# Patient Record
Sex: FEMALE | Race: WHITE | NOT HISPANIC OR LATINO | Employment: UNEMPLOYED | URBAN - NONMETROPOLITAN AREA
[De-identification: names, ages, dates, MRNs, and addresses within clinical notes are randomized per-mention and may not be internally consistent; named-entity substitution may affect disease eponyms.]

---

## 2017-07-12 ENCOUNTER — TRANSCRIBE ORDERS (OUTPATIENT)
Dept: CARDIOLOGY | Facility: HOSPITAL | Age: 37
End: 2017-07-12

## 2017-07-12 DIAGNOSIS — R55 SYNCOPE, UNSPECIFIED SYNCOPE TYPE: Primary | ICD-10-CM

## 2017-07-19 ENCOUNTER — HOSPITAL ENCOUNTER (OUTPATIENT)
Dept: CARDIOLOGY | Facility: HOSPITAL | Age: 37
Discharge: HOME | End: 2017-07-19
Admitting: NURSE PRACTITIONER

## 2017-07-19 DIAGNOSIS — R55 SYNCOPE, UNSPECIFIED SYNCOPE TYPE: ICD-10-CM

## 2017-07-19 PROCEDURE — 93306 TTE W/DOPPLER COMPLETE: CPT

## 2017-07-20 LAB — BH CV ECHO MEAS - EF(MOD-SP4): 59 %

## 2017-07-21 ENCOUNTER — OFFICE VISIT (OUTPATIENT)
Dept: NEUROLOGY | Facility: CLINIC | Age: 37
End: 2017-07-21

## 2017-07-21 VITALS
DIASTOLIC BLOOD PRESSURE: 64 MMHG | OXYGEN SATURATION: 99 % | BODY MASS INDEX: 21.37 KG/M2 | HEART RATE: 81 BPM | HEIGHT: 68 IN | SYSTOLIC BLOOD PRESSURE: 120 MMHG | WEIGHT: 141 LBS

## 2017-07-21 DIAGNOSIS — R55 SYNCOPE AND COLLAPSE: Primary | ICD-10-CM

## 2017-07-21 PROCEDURE — 99243 OFF/OP CNSLTJ NEW/EST LOW 30: CPT | Performed by: PSYCHIATRY & NEUROLOGY

## 2017-07-21 RX ORDER — SUMATRIPTAN 50 MG/1
TABLET, FILM COATED ORAL
COMMUNITY
Start: 2017-07-10 | End: 2023-03-08

## 2017-07-21 RX ORDER — PAROXETINE 30 MG/1
TABLET, FILM COATED ORAL
COMMUNITY
Start: 2017-07-10 | End: 2023-03-08

## 2017-07-21 RX ORDER — HYDROXYZINE PAMOATE 25 MG/1
CAPSULE ORAL
COMMUNITY
Start: 2017-06-08 | End: 2017-08-08 | Stop reason: ALTCHOICE

## 2017-07-21 RX ORDER — AMITRIPTYLINE HYDROCHLORIDE 50 MG/1
TABLET, FILM COATED ORAL
COMMUNITY
Start: 2017-06-08 | End: 2023-03-08

## 2017-07-21 NOTE — PROGRESS NOTES
[facility] NEUROLOGY [place] CONSULTATION   History of Present Illness     Date: 7/22/2017    Patient Identification  Merissa Wiggins is a 37 y.o. female.    Patient information was obtained from patient and spouse/SO.  History/Exam limitations: none.    CONSULTATION requested by: Layla Pennington Ma*      Chief Complaint   Seizures (NP/Pt has had sz like activity a few weeks ago, pt has had this happen in the past and went to the ER)      History of Present Illness   Patient is a delightful 37-year-old lady referred to [facility] neurology [place] for evaluation of seizure like activity.  According the  there is no warning aura and there is no postictal confusion after the episode.  She was brought to the outside hospital for EEG and MRI scan and none of them available this visit she has never been eval by cardiologist  .   PMH:   Past Medical History:   Diagnosis Date   • Anxiety    • Depression    • Migraine        Past Surgical History:   Past Surgical History:   Procedure Laterality Date   • CHOLECYSTECTOMY  2016   • POSTPARTUM TUBAL LIGATION Bilateral 2007       Family Hisotry:   Family History   Problem Relation Age of Onset   • Hypertension Mother    • Mental illness Mother    • Mental illness Sister    • Hypertension Maternal Aunt    • Mental illness Maternal Aunt    • Mental illness Paternal Aunt    • Hypertension Maternal Grandmother    • Breast cancer Maternal Grandmother    • Alzheimer's disease Maternal Grandfather    • Diabetes Maternal Grandfather    • Hypertension Maternal Grandfather    • Mental illness Sister    • Mental illness Maternal Aunt    • Mental illness Paternal Aunt        Social History:   Social History     Social History   • Marital status:      Spouse name: N/A   • Number of children: N/A   • Years of education: N/A     Occupational History   • Not on file.     Social History Main Topics   • Smoking status: Current Every Day Smoker     Packs/day: 1.00      "Years: 30.00   • Smokeless tobacco: Never Used   • Alcohol use No   • Drug use: No   • Sexual activity: Defer     Other Topics Concern   • Not on file     Social History Narrative   • No narrative on file       Medications:   Current Outpatient Prescriptions   Medication Sig Dispense Refill   • hydrOXYzine (VISTARIL) 25 MG capsule Take one at bedtime     • PARoxetine (PAXIL) 30 MG tablet Take one daily     • SUMAtriptan (IMITREX) 50 MG tablet Take one at the onset of migraine, may repeat in 2 hours if needed. Do not exceed 2 in 24 hours     • amitriptyline (ELAVIL) 50 MG tablet Take 2 tablets at bedtime       No current facility-administered medications for this visit.        Allergy:   Allergies   Allergen Reactions   • Penicillins Other (See Comments)     Pt states \"I passed out\"       Review of Systems:  Review of Systems    Physical Exam     Vitals:    07/21/17 1453   BP: 120/64   Pulse: 81   SpO2: 99%   Weight: 141 lb (64 kg)   Height: 68\" (172.7 cm)     GENERAL: Patient is pleasant, cooperative, appears to be stated age.  Body habitus is endomorphic.  SKIN AND EXTREMITIES:  No skin rashes or lesions are noted.  No cyanosis, clubbing or edema of the extremities.    HEAD:  Head is normocephalic and atraumatic.    NECK: Neck are non-tender without thyromegaly or adenopathy.  Carotic upstrokes are 1+/4.  No cranial or cervical bruits.  The neck is supple with a full range of motion.   ENT: palate elevate symmetrically, no evidence of high arch palate, tongue midline erythema in posterior pharynx, Mallampati Classification Class III   CARDIOVASCULAR:  Regular rate and rhythm with normal S1 and S2 without rub or gallop.  RESPIRATORY:  Clear to auscultation without wheezes or crackle   ABDOMEN:  Soft and non-tender, positive bowel sound without hepatosplenomegaly  BACK:  Back is straight without midline defect.    PSYCH:  Higher cortical function/mental status:  The patient is alert.  She is oriented x3 to time, " place and person.  Recent and the remote memory appear normal.  The patient has a good fund of knowledge.  There is no visual or auditory hallucination or suicidal or homicidal ideation.  SPEECH:There is no gross evidence of aphasia, dysarthria or agnosia.      CRANIAL NERVES:  Pupils are 4mm, equal round reactive to light, reacting briskly to 2mm without afferent pupillary defect.  Visual fields are intact to confrontation testing.  Fundoscopic examination reveals sharp disk margins with normal vasculature.  No papilledema, hemorrhages or exudates.  Extraocular movements are full and smooth with normal pursuits and saccades.  No nystagmus noted.  The face is symmetric. palate elevate symmetrically, Tongue midline, positive gag reflex. The remainder of the cranial nerves are intact and symmetrical.    MOTOR: Strength is 5/5 throughout with normal tone and bulk with the following exceptions, 4/5 intrinsic muscles of the hands and feet.  No involuntary movements noted.    Deep Tendon Reflexes: are 2/4 and symmetrical in the upper extremities, 2/4 and symmetrical at the knees and 1/4 and symmetrical at the Achilles tendon.  Plantar responses were down-going bilaterally.    SENSATION:  Intact to pinprick, light touch, vibration and proprioception.  Coordination:  The patient normally performs finger-nose-finger, heel-to-knee-to-shin and rapid alternating movements in symmetrical fashion.    COORDINATION AND GAIT:  The patient walks with a narrow-based gait.  Patient is able to heel-toe and tandem walk forward and backwards without difficulty.  Romberg and monopedal  Romberg are negative.    MUSCULOSKELETAL: Range of motion normal, no clubbing, cyanosis, or edema.  No joint swelling.            Records Reviewed: I have personally reviewed her previous medical record.    Merissa was seen today for seizures.    Diagnoses and all orders for this visit:    Syncope and collapse  -     Ambulatory Referral to  Cardiology      Treatments:  1.  I'll refer this patient to cardiologist for further evaluation for syncope  2.  We'll obtain medical records including MRI and EEG from outside hospital for further evaluation  3.  Advised patient not to drive for 3 month from her last episode of syncope    This Document is signed by Guicho Fuller MD, FAAN, FAASM July 22, 20175:15 PM

## 2017-07-21 NOTE — PROGRESS NOTES
[facility] NEUROLOGY [place] CONSULTATION   History of Present Illness     Date: 7/22/2017    Patient Identification  Merissa Wiggins is a 37 y.o. female.    Patient information was obtained from patient.  History/Exam limitations: none.    CONSULTATION requested by: Layla Pennington Ma*      Chief Complaint   Seizures (NP/Pt has had sz like activity a few weeks ago, pt has had this happen in the past and went to the ER)      History of Present Illness   Patient is a pleasant 37-year-old lady who been right notes of seizure disorder 2 and half months ago and the patient denies any triggering factors or any symptoms with the would bring on her symptoms patient reported that her seizure is of brief loss of consciousness without any warning and there is no postictal state.    PMH:   Past Medical History:   Diagnosis Date   • Anxiety    • Depression    • Migraine        Past Surgical History:   Past Surgical History:   Procedure Laterality Date   • CHOLECYSTECTOMY  2016   • POSTPARTUM TUBAL LIGATION Bilateral 2007       Family Hisotry:   Family History   Problem Relation Age of Onset   • Hypertension Mother    • Mental illness Mother    • Mental illness Sister    • Hypertension Maternal Aunt    • Mental illness Maternal Aunt    • Mental illness Paternal Aunt    • Hypertension Maternal Grandmother    • Breast cancer Maternal Grandmother    • Alzheimer's disease Maternal Grandfather    • Diabetes Maternal Grandfather    • Hypertension Maternal Grandfather    • Mental illness Sister    • Mental illness Maternal Aunt    • Mental illness Paternal Aunt        Social History:   Social History     Social History   • Marital status:      Spouse name: N/A   • Number of children: N/A   • Years of education: N/A     Occupational History   • Not on file.     Social History Main Topics   • Smoking status: Current Every Day Smoker     Packs/day: 1.00     Years: 30.00   • Smokeless tobacco: Never Used   • Alcohol use No  "  • Drug use: No   • Sexual activity: Defer     Other Topics Concern   • Not on file     Social History Narrative   • No narrative on file       Medications:   Current Outpatient Prescriptions   Medication Sig Dispense Refill   • hydrOXYzine (VISTARIL) 25 MG capsule Take one at bedtime     • PARoxetine (PAXIL) 30 MG tablet Take one daily     • SUMAtriptan (IMITREX) 50 MG tablet Take one at the onset of migraine, may repeat in 2 hours if needed. Do not exceed 2 in 24 hours     • amitriptyline (ELAVIL) 50 MG tablet Take 2 tablets at bedtime       No current facility-administered medications for this visit.        Allergy:   Allergies   Allergen Reactions   • Penicillins Other (See Comments)     Pt states \"I passed out\"       Review of Systems:  Review of Systems   Constitutional: Negative for chills and fever.   HENT: Negative for congestion, ear pain, hearing loss, rhinorrhea and sore throat.    Eyes: Negative for pain, discharge and redness.   Respiratory: Negative for cough, shortness of breath, wheezing and stridor.    Cardiovascular: Negative for chest pain, palpitations and leg swelling.   Gastrointestinal: Negative for abdominal pain, constipation, nausea and vomiting.   Endocrine: Negative for cold intolerance, heat intolerance and polyphagia.   Genitourinary: Negative for dysuria, flank pain, frequency and urgency.   Musculoskeletal: Negative for joint swelling, myalgias, neck pain and neck stiffness.   Skin: Negative for pallor, rash and wound.   Allergic/Immunologic: Negative for environmental allergies.   Neurological: Positive for syncope. Negative for dizziness, tremors, seizures, facial asymmetry, speech difficulty, weakness, light-headedness, numbness and headaches.   Hematological: Negative for adenopathy.   Psychiatric/Behavioral: Negative for confusion and hallucinations. The patient is not nervous/anxious.        Physical Exam     Vitals:    07/21/17 1453   BP: 120/64   Pulse: 81   SpO2: 99% " "  Weight: 141 lb (64 kg)   Height: 68\" (172.7 cm)     GENERAL: Patient is pleasant, cooperative, appears to be stated age.  Body habitus is endomorphic.  SKIN AND EXTREMITIES:  No skin rashes or lesions are noted.  No cyanosis, clubbing or edema of the extremities.    HEAD:  Head is normocephalic and atraumatic.    NECK: Neck are non-tender without thyromegaly or adenopathy.  Carotic upstrokes are 1+/4.  No cranial or cervical bruits.  The neck is supple with a full range of motion.   ENT: palate elevate symmetrically, no evidence of high arch palate, tongue midline erythema in posterior pharynx, Mallampati Classification Class III   CARDIOVASCULAR:  Regular rate and rhythm with normal S1 and S2 without rub or gallop.  RESPIRATORY:  Clear to auscultation without wheezes or crackle   ABDOMEN:  Soft and non-tender, positive bowel sound without hepatosplenomegaly  BACK:  Back is straight without midline defect.    PSYCH:  Higher cortical function/mental status:  The patient is alert.  She is oriented x3 to time, place and person.  Recent and the remote memory appear normal.  The patient has a good fund of knowledge.  There is no visual or auditory hallucination or suicidal or homicidal ideation.  SPEECH:There is no gross evidence of aphasia, dysarthria or agnosia.      CRANIAL NERVES:  Pupils are 4mm, equal round reactive to light, reacting briskly to 2mm without afferent pupillary defect.  Visual fields are intact to confrontation testing.  Fundoscopic examination reveals sharp disk margins with normal vasculature.  No papilledema, hemorrhages or exudates.  Extraocular movements are full and smooth with normal pursuits and saccades.  No nystagmus noted.  The face is symmetric. palate elevate symmetrically, Tongue midline, positive gag reflex. The remainder of the cranial nerves are intact and symmetrical.    MOTOR: Strength is 5/5 throughout with normal tone and bulk with the following exceptions, 4/5 intrinsic " muscles of the hands and feet.  No involuntary movements noted.    Deep Tendon Reflexes: are 2/4 and symmetrical in the upper extremities, 2/4 and symmetrical at the knees and 1/4 and symmetrical at the Achilles tendon.  Plantar responses were down-going bilaterally.    SENSATION:  Intact to pinprick, light touch, vibration and proprioception.  Coordination:  The patient normally performs finger-nose-finger, heel-to-knee-to-shin and rapid alternating movements in symmetrical fashion.    COORDINATION AND GAIT:  The patient walks with a narrow-based gait.  Patient is able to heel-toe and tandem walk forward and backwards without difficulty.  Romberg and monopedal  Romberg are negative.    MUSCULOSKELETAL: Range of motion normal, no clubbing, cyanosis, or edema.  No joint swelling.              Records Reviewed: I have personally reviewed her previous medical record.    Merissa was seen today for seizures.    Diagnoses and all orders for this visit:    Syncope and collapse  -     Ambulatory Referral to Cardiology      Treatments:  1.  We'll refer this patient to cardiologist for further evaluation for syncope  2.  We'll obtain medical record from outside hospital to further evaluate her EEG and MRI of the brain  3.  Her  who witnessed the episode did not mention any postictal state and there is brief loss of consciousness without any warning aura    This Document is signed by Guicho Fuller MD, FAAN, FAASM July 22, 20175:12 PM

## 2017-08-08 ENCOUNTER — CONSULT (OUTPATIENT)
Dept: CARDIOLOGY | Facility: CLINIC | Age: 37
End: 2017-08-08

## 2017-08-08 VITALS
OXYGEN SATURATION: 100 % | SYSTOLIC BLOOD PRESSURE: 128 MMHG | WEIGHT: 138 LBS | HEART RATE: 90 BPM | DIASTOLIC BLOOD PRESSURE: 84 MMHG | BODY MASS INDEX: 20.92 KG/M2 | HEIGHT: 68 IN | RESPIRATION RATE: 18 BRPM

## 2017-08-08 DIAGNOSIS — R00.2 PALPITATIONS: ICD-10-CM

## 2017-08-08 DIAGNOSIS — R06.02 SHORTNESS OF BREATH: ICD-10-CM

## 2017-08-08 DIAGNOSIS — R42 DIZZINESS: ICD-10-CM

## 2017-08-08 DIAGNOSIS — R07.9 CHEST PAIN IN ADULT: Primary | ICD-10-CM

## 2017-08-08 DIAGNOSIS — R55 VASOVAGAL SYNCOPE: ICD-10-CM

## 2017-08-08 DIAGNOSIS — R94.31 ABNORMAL ECG: ICD-10-CM

## 2017-08-08 PROCEDURE — 99204 OFFICE O/P NEW MOD 45 MIN: CPT | Performed by: INTERNAL MEDICINE

## 2017-08-08 PROCEDURE — 93010 ELECTROCARDIOGRAM REPORT: CPT | Performed by: INTERNAL MEDICINE

## 2017-08-08 NOTE — PROGRESS NOTES
"    Subjective:     Encounter Date:08/08/2017      Patient ID: Merissa Wiggins is a 37 y.o. female.    Chief Complaint:Syncope, palpitations, dizziness, shortness of breath, chest pain and edema  HPI  This is a 37-year-old female patient who reports having multiple syncopal episodes.  The patient indicates that she has had syncopal episodes.  All my life\".  Over the last 3 months she has had 4 episodes.  She indicates that prior to the syncopal episode she will become diaphoretic and will have a heart flushing sensation.  There is no nausea but she does have some chest tightness and shortness of breath prior to the event.  She will then developed severe dizziness with a sense of tunnel vision just before she passes out.  She has complete loss of consciousness which lasts for less than 1 minute.  Several of these episodes have been witnessed by her  and there is no tonic-clonic seizure activity.  She has had no loss of bowel or bladder function.  She does not bite her tongue or the inside of her mouth.  After the episode she feels extremely weak and fatigued and will often sleep for 2-3 days post event.  In addition after the episode she is very disoriented and confused which takes several hours for her mental status to clear up.  Separate from her syncope the patient reports having chest discomfort which has been occurring for approximately 2 years.  Her chest discomfort is localized to a discrete area in her central sternum and does not radiate.  It has a sharp stabbing quality.  It typically has a 6/10 in intensity which is variable.  She cannot identify any precipitating aggravating or alleviating features.  It tends to occur approximately 4-5 times per week and generally last anywhere from a few minutes to several hours.  The discomfort usually occurs while she is working.  Otherwise she cannot identify other aggravating features.  She has never had any form of testing other than a 12-lead " electrocardiogram.  She has no history of hypertension diabetes or hypercholesterolemia.  She is an active smoker smoking at least one pack of cigarettes per day.  Her family history is positive for premature coronary disease.  The patient indicates that she is unable to do treadmill exercise stress testing due to the severity of her shortness of breath and poor effort tolerance.  The patient reports having shortness of breath both at rest and with activity.  The frequency duration and intensity of her chest discomfort and shortness of breath have all gradually worsened over the last several months.  The patient has no orthopnea or PND but does have lower extremity edema which is typically worse if she's been on her feet for prolonged periods of time.  The patient also reports having palpitations with a sense that her heart is pounding and racing.  This is been occurring for the last several months.  It typically occurs 3-4 times per week and generally last anywhere from a few seconds to several minutes.  There is some associated dizziness.  This has occasionally accompanied her syncopal episodes but not consistently.  The following portions of the patient's history were reviewed and updated as appropriate: allergies, current medications, past family history, past medical history, past social history, past surgical history and problem  Review of Systems   Constitution: Positive for weakness and malaise/fatigue. Negative for chills, diaphoresis, fever, night sweats, weight gain and weight loss.   HENT: Negative for ear discharge, hearing loss, hoarse voice and nosebleeds.    Eyes: Negative for discharge, double vision, pain and photophobia.   Cardiovascular: Positive for chest pain, dyspnea on exertion, irregular heartbeat, leg swelling, near-syncope, palpitations and syncope. Negative for claudication, cyanosis, orthopnea and paroxysmal nocturnal dyspnea.   Respiratory: Positive for shortness of breath. Negative for  cough, hemoptysis, sputum production and wheezing.    Endocrine: Negative for cold intolerance, heat intolerance, polydipsia, polyphagia and polyuria.   Hematologic/Lymphatic: Negative for adenopathy and bleeding problem. Does not bruise/bleed easily.   Skin: Negative for color change, flushing, itching and rash.   Musculoskeletal: Negative for muscle cramps, muscle weakness, myalgias and stiffness.   Gastrointestinal: Negative for abdominal pain, diarrhea, hematemesis, hematochezia, nausea and vomiting.   Genitourinary: Negative for dysuria, frequency and nocturia.   Neurological: Positive for dizziness. Negative for focal weakness, loss of balance, numbness, paresthesias and seizures.   Psychiatric/Behavioral: Negative for altered mental status, hallucinations and suicidal ideas.   Allergic/Immunologic: Negative for HIV exposure, hives and persistent infections.       Current Outpatient Prescriptions:   •  amitriptyline (ELAVIL) 50 MG tablet, Take 2 tablets at bedtime, Disp: , Rfl:   •  PARoxetine (PAXIL) 30 MG tablet, Take one daily, Disp: , Rfl:   •  SUMAtriptan (IMITREX) 50 MG tablet, Take one at the onset of migraine, may repeat in 2 hours if needed. Do not exceed 2 in 24 hours, Disp: , Rfl:      Objective:     Physical Exam   Constitutional: She is oriented to person, place, and time. She appears well-developed and well-nourished.   HENT:   Head: Normocephalic and atraumatic.   Mouth/Throat: Oropharynx is clear and moist.   Eyes: Conjunctivae and EOM are normal. Pupils are equal, round, and reactive to light. No scleral icterus.   Neck: Normal range of motion. Neck supple. No JVD present. No tracheal deviation present. No thyromegaly present.   Cardiovascular: Normal rate, regular rhythm, S1 normal, S2 normal, normal heart sounds, intact distal pulses and normal pulses.  PMI is not displaced.  Exam reveals no gallop and no friction rub.    No murmur heard.  Pulmonary/Chest: Effort normal and breath sounds  "normal. No respiratory distress. She has no wheezes. She has no rales.   Abdominal: Soft. Bowel sounds are normal. She exhibits no distension and no mass. There is no tenderness. There is no rebound and no guarding.   Musculoskeletal: Normal range of motion. She exhibits no edema or deformity.   Neurological: She is alert and oriented to person, place, and time. She displays normal reflexes. No cranial nerve deficit. Coordination normal.   Skin: Skin is warm and dry. No rash noted. No erythema.   Psychiatric: She has a normal mood and affect. Her behavior is normal. Thought content normal.     Blood pressure 128/84, pulse 90, resp. rate 18, height 68\" (172.7 cm), weight 138 lb (62.6 kg), SpO2 100 %.   Lab Review:       Assessment:         1. Chest pain in adult  The patient's chest discomfort is mostly atypical.  She does have several risk factors for coronary artery disease.  She has never had a stress test.  She indicates that she is unable to do treadmill stress testing due to her poor effort tolerance and severe shortness of breath.  - ECG 12 Lead  - Stress Test With Myocardial Perfusion - Two Day    2. Shortness of breath  I suspect this is multifactorial.  Some is certainly related to her ongoing tobacco abuse.  She probably has an element of early tobacco-related lung disease.  This could also represent unrecognized coronary artery disease as an angina equivalent.  The patient has had an echocardiogram performed which showed normal left ventricular systolic and diastolic function.  There was no evidence of significant valvular abnormalities, pericardial disease, intracardiac shunting or pulmonary hypertension.  - ECG 12 Lead  - Stress Test With Myocardial Perfusion - Two Day    3. Palpitations  The patient could be having an underlying arrhythmia.  - ECG 12 Lead  - Holter Monitor - 48 Hour; Future    4. Dizziness  I suspect her dizziness is related to her neurocardiogenic syncope.  His could also be related " to an underlying arrhythmia.  - ECG 12 Lead  - Holter Monitor - 48 Hour; Future    5. Vasovagal syncope  I suspect the patient may be experiencing reflex syncope.  Some of her symptoms particularly having the profound fatigue for several days after an episode as well as severe confusion and disorientation after the event sounds suspicious for a post ictal state related to generalized seizures.  Several of these episodes have been witnessed by her  who is not recognized any tonic-clonic seizure activity.  - Tilt Table  - Holter Monitor - 48 Hour; Future    6. Abnormal ECG  Her 12-lead electrocardiogram shows QS complexes present in leads V1 and V2 and V3 as well as evidence of left atrial enlargement.  She has no personal history of documented myocardial infarction.    ECG 12 Lead  Date/Time: 8/8/2017 10:29 AM  Performed by: ALEX FROST  Authorized by: ALEX FROST   Rhythm: sinus rhythm  Rate: normal  QRS axis: normal  Clinical impression: normal ECG  Comments: Decreased voltage in the right and mid precordial leads.  Nonspecific ST segment changes.             Plan:       I have recommended a head upright tilt table test, a vasodilator nuclear stress test and a 48 hour Holter monitor.  She has been counseled regarding the essential need to discontinue cigarette smoking.  No changes in her medication profile have been made at today's visit.  Further recommendations will be predicated on the results of her outpatient testing.

## 2017-08-10 ENCOUNTER — HOSPITAL ENCOUNTER (EMERGENCY)
Facility: HOSPITAL | Age: 37
Discharge: HOME | End: 2017-08-10
Attending: EMERGENCY MEDICINE | Admitting: EMERGENCY MEDICINE

## 2017-08-10 VITALS
HEIGHT: 68 IN | HEART RATE: 70 BPM | TEMPERATURE: 98 F | OXYGEN SATURATION: 100 % | BODY MASS INDEX: 21.22 KG/M2 | WEIGHT: 140 LBS | SYSTOLIC BLOOD PRESSURE: 130 MMHG | DIASTOLIC BLOOD PRESSURE: 87 MMHG | RESPIRATION RATE: 18 BRPM

## 2017-08-10 DIAGNOSIS — R11.2 NON-INTRACTABLE VOMITING WITH NAUSEA, UNSPECIFIED VOMITING TYPE: ICD-10-CM

## 2017-08-10 DIAGNOSIS — R51.9 NONINTRACTABLE HEADACHE, UNSPECIFIED CHRONICITY PATTERN, UNSPECIFIED HEADACHE TYPE: Primary | ICD-10-CM

## 2017-08-10 LAB
ALBUMIN SERPL-MCNC: 3.8 G/DL (ref 3.5–5)
ALBUMIN/GLOB SERPL: 1.2 G/DL (ref 1–2)
ALP SERPL-CCNC: 163 U/L (ref 38–126)
ALT SERPL W P-5'-P-CCNC: 96 U/L (ref 13–69)
ANION GAP SERPL CALCULATED.3IONS-SCNC: 12.7 MMOL/L
AST SERPL-CCNC: 52 U/L (ref 15–46)
BASOPHILS # BLD AUTO: 0.03 10*3/MM3 (ref 0–0.2)
BASOPHILS NFR BLD AUTO: 0.3 % (ref 0–2.5)
BILIRUB SERPL-MCNC: 0.4 MG/DL (ref 0.2–1.3)
BILIRUB UR QL STRIP: NEGATIVE
BUN BLD-MCNC: 10 MG/DL (ref 7–20)
BUN/CREAT SERPL: 14.3 (ref 7.1–23.5)
CALCIUM SPEC-SCNC: 8.9 MG/DL (ref 8.4–10.2)
CHLORIDE SERPL-SCNC: 107 MMOL/L (ref 98–107)
CLARITY UR: CLEAR
CO2 SERPL-SCNC: 24 MMOL/L (ref 26–30)
COLOR UR: YELLOW
CREAT BLD-MCNC: 0.7 MG/DL (ref 0.6–1.3)
DEPRECATED RDW RBC AUTO: 50.3 FL (ref 37–54)
EOSINOPHIL # BLD AUTO: 0.47 10*3/MM3 (ref 0–0.7)
EOSINOPHIL NFR BLD AUTO: 4.1 % (ref 0–7)
ERYTHROCYTE [DISTWIDTH] IN BLOOD BY AUTOMATED COUNT: 15.6 % (ref 11.5–14.5)
GFR SERPL CREATININE-BSD FRML MDRD: 93 ML/MIN/1.73
GLOBULIN UR ELPH-MCNC: 3.2 GM/DL
GLUCOSE BLD-MCNC: 103 MG/DL (ref 74–98)
GLUCOSE UR STRIP-MCNC: NEGATIVE MG/DL
HCG SERPL QL: NEGATIVE
HCT VFR BLD AUTO: 37.6 % (ref 37–47)
HGB BLD-MCNC: 12.3 G/DL (ref 12–16)
HGB UR QL STRIP.AUTO: NEGATIVE
IMM GRANULOCYTES # BLD: 0.06 10*3/MM3 (ref 0–0.06)
IMM GRANULOCYTES NFR BLD: 0.5 % (ref 0–0.6)
KETONES UR QL STRIP: ABNORMAL
LEUKOCYTE ESTERASE UR QL STRIP.AUTO: NEGATIVE
LIPASE SERPL-CCNC: 101 U/L (ref 23–300)
LYMPHOCYTES # BLD AUTO: 1.51 10*3/MM3 (ref 0.6–3.4)
LYMPHOCYTES NFR BLD AUTO: 13.3 % (ref 10–50)
MCH RBC QN AUTO: 28.9 PG (ref 27–31)
MCHC RBC AUTO-ENTMCNC: 32.7 G/DL (ref 30–37)
MCV RBC AUTO: 88.3 FL (ref 81–99)
MONOCYTES # BLD AUTO: 0.65 10*3/MM3 (ref 0–0.9)
MONOCYTES NFR BLD AUTO: 5.7 % (ref 0–12)
NEUTROPHILS # BLD AUTO: 8.65 10*3/MM3 (ref 2–6.9)
NEUTROPHILS NFR BLD AUTO: 76.1 % (ref 37–80)
NITRITE UR QL STRIP: NEGATIVE
NRBC BLD MANUAL-RTO: 0 /100 WBC (ref 0–0)
PH UR STRIP.AUTO: 5.5 [PH] (ref 5–8)
PLATELET # BLD AUTO: 383 10*3/MM3 (ref 130–400)
PMV BLD AUTO: 9.6 FL (ref 6–12)
POTASSIUM BLD-SCNC: 3.7 MMOL/L (ref 3.5–5.1)
PROT SERPL-MCNC: 7 G/DL (ref 6.3–8.2)
PROT UR QL STRIP: NEGATIVE
RBC # BLD AUTO: 4.26 10*6/MM3 (ref 4.2–5.4)
SODIUM BLD-SCNC: 140 MMOL/L (ref 137–145)
SP GR UR STRIP: 1.02 (ref 1–1.03)
UROBILINOGEN UR QL STRIP: ABNORMAL
WBC NRBC COR # BLD: 11.37 10*3/MM3 (ref 4.8–10.8)

## 2017-08-10 PROCEDURE — 85025 COMPLETE CBC W/AUTO DIFF WBC: CPT | Performed by: EMERGENCY MEDICINE

## 2017-08-10 PROCEDURE — 96361 HYDRATE IV INFUSION ADD-ON: CPT

## 2017-08-10 PROCEDURE — 84703 CHORIONIC GONADOTROPIN ASSAY: CPT | Performed by: EMERGENCY MEDICINE

## 2017-08-10 PROCEDURE — 83690 ASSAY OF LIPASE: CPT | Performed by: EMERGENCY MEDICINE

## 2017-08-10 PROCEDURE — 93005 ELECTROCARDIOGRAM TRACING: CPT | Performed by: EMERGENCY MEDICINE

## 2017-08-10 PROCEDURE — 25010000002 METOCLOPRAMIDE PER 10 MG: Performed by: EMERGENCY MEDICINE

## 2017-08-10 PROCEDURE — 99284 EMERGENCY DEPT VISIT MOD MDM: CPT

## 2017-08-10 PROCEDURE — 80053 COMPREHEN METABOLIC PANEL: CPT | Performed by: EMERGENCY MEDICINE

## 2017-08-10 PROCEDURE — 81003 URINALYSIS AUTO W/O SCOPE: CPT | Performed by: EMERGENCY MEDICINE

## 2017-08-10 PROCEDURE — 25010000002 ONDANSETRON PER 1 MG: Performed by: EMERGENCY MEDICINE

## 2017-08-10 PROCEDURE — 25010000002 DIPHENHYDRAMINE PER 50 MG: Performed by: EMERGENCY MEDICINE

## 2017-08-10 PROCEDURE — 96375 TX/PRO/DX INJ NEW DRUG ADDON: CPT

## 2017-08-10 PROCEDURE — 25010000002 KETOROLAC TROMETHAMINE PER 15 MG: Performed by: EMERGENCY MEDICINE

## 2017-08-10 PROCEDURE — 96374 THER/PROPH/DIAG INJ IV PUSH: CPT

## 2017-08-10 RX ORDER — METOCLOPRAMIDE HYDROCHLORIDE 5 MG/ML
10 INJECTION INTRAMUSCULAR; INTRAVENOUS ONCE
Status: COMPLETED | OUTPATIENT
Start: 2017-08-10 | End: 2017-08-10

## 2017-08-10 RX ORDER — KETOROLAC TROMETHAMINE 30 MG/ML
30 INJECTION, SOLUTION INTRAMUSCULAR; INTRAVENOUS ONCE
Status: COMPLETED | OUTPATIENT
Start: 2017-08-10 | End: 2017-08-10

## 2017-08-10 RX ORDER — ONDANSETRON 2 MG/ML
4 INJECTION INTRAMUSCULAR; INTRAVENOUS ONCE
Status: COMPLETED | OUTPATIENT
Start: 2017-08-10 | End: 2017-08-10

## 2017-08-10 RX ORDER — ONDANSETRON 4 MG/1
4 TABLET, FILM COATED ORAL EVERY 6 HOURS PRN
Qty: 10 TABLET | Refills: 0 | Status: SHIPPED | OUTPATIENT
Start: 2017-08-10 | End: 2017-10-24

## 2017-08-10 RX ORDER — DIPHENHYDRAMINE HYDROCHLORIDE 50 MG/ML
50 INJECTION INTRAMUSCULAR; INTRAVENOUS ONCE
Status: COMPLETED | OUTPATIENT
Start: 2017-08-10 | End: 2017-08-10

## 2017-08-10 RX ADMIN — METOCLOPRAMIDE 10 MG: 5 INJECTION, SOLUTION INTRAMUSCULAR; INTRAVENOUS at 07:54

## 2017-08-10 RX ADMIN — KETOROLAC TROMETHAMINE 30 MG: 30 INJECTION, SOLUTION INTRAMUSCULAR at 07:50

## 2017-08-10 RX ADMIN — ONDANSETRON 4 MG: 2 INJECTION INTRAMUSCULAR; INTRAVENOUS at 07:49

## 2017-08-10 RX ADMIN — SODIUM CHLORIDE 1000 ML: 9 INJECTION, SOLUTION INTRAVENOUS at 07:48

## 2017-08-10 RX ADMIN — DIPHENHYDRAMINE HYDROCHLORIDE 50 MG: 50 INJECTION, SOLUTION INTRAMUSCULAR; INTRAVENOUS at 07:52

## 2017-08-10 NOTE — ED PROVIDER NOTES
"Subjective   HPI Comments: 37 year old female presenting with nausea, vomiting, \"spells\". She is brought in by her  who helps provide the history. This morning patient had a spell where she \"blacked out\", was out for several minutes apparently. For the last 3-4 days she has had headache, nausea, vomiting, \"unable to keep down anything\". This morning the spell was accompanied by \"shaking all over\". She was in bed and did not fall. She denies any chest pain, shortness of breath, abdominal pain, fevers, chills.  Within the last 2 weeks she has been seen by both neurology and cardiology for these \"spells\".  She's had multiple tests run has multiple studies coming up in the next couple weeks.  Apparently they have been going on for even longer than that, upwards of nearly 6 months.  She is usually very tired for several days after a spell.  She's also been evaluated at several outside hospitals.  She's had head imaging that is reported as negative.      Review of Systems   Constitutional: Negative for chills and fever.   HENT: Negative for congestion, rhinorrhea and sore throat.    Eyes: Negative for pain.   Respiratory: Negative for cough and shortness of breath.    Cardiovascular: Negative for chest pain, palpitations and leg swelling.   Gastrointestinal: Positive for nausea and vomiting. Negative for abdominal pain and diarrhea.   Genitourinary: Negative for dysuria.   Musculoskeletal: Negative for arthralgias.   Skin: Negative for rash.   Neurological: Positive for syncope and headaches. Negative for weakness and numbness.   Psychiatric/Behavioral: Negative for behavioral problems.       Past Medical History:   Diagnosis Date   • Anxiety    • Depression    • GERD (gastroesophageal reflux disease)    • Migraine        Allergies   Allergen Reactions   • Penicillins Other (See Comments)     Pt states \"I passed out\"       Past Surgical History:   Procedure Laterality Date   • CHOLECYSTECTOMY  2016   • POSTPARTUM " TUBAL LIGATION Bilateral 2007   • TUBAL ABDOMINAL LIGATION         Family History   Problem Relation Age of Onset   • Hypertension Mother    • Mental illness Mother    • Hyperlipidemia Mother    • Migraines Mother    • Diabetes Mother    • Mental illness Sister    • Hypertension Maternal Aunt    • Mental illness Maternal Aunt    • Mental illness Paternal Aunt    • Hypertension Maternal Grandmother    • Breast cancer Maternal Grandmother    • Hyperlipidemia Maternal Grandmother    • Diabetes Maternal Grandmother    • Heart attack Maternal Grandmother    • Alzheimer's disease Maternal Grandfather    • Diabetes Maternal Grandfather    • Hypertension Maternal Grandfather    • Hyperlipidemia Maternal Grandfather    • Heart attack Maternal Grandfather    • Kidney disease Maternal Grandfather    • Mental illness Sister    • Mental illness Maternal Aunt    • Diabetes Maternal Aunt    • Mental illness Paternal Aunt        Social History     Social History   • Marital status:      Spouse name: N/A   • Number of children: N/A   • Years of education: N/A     Social History Main Topics   • Smoking status: Current Every Day Smoker     Packs/day: 1.00     Years: 30.00     Types: Cigarettes   • Smokeless tobacco: Never Used   • Alcohol use No   • Drug use: No   • Sexual activity: Defer     Other Topics Concern   • None     Social History Narrative   • None           Objective   Physical Exam   Constitutional: She is oriented to person, place, and time. She appears well-developed and well-nourished. No distress.   HENT:   Head: Normocephalic and atraumatic.   Right Ear: External ear normal.   Left Ear: External ear normal.   Nose: Nose normal.   Mouth/Throat: Oropharynx is clear and moist.   Eyes: Conjunctivae and EOM are normal. Pupils are equal, round, and reactive to light.   Neck: Normal range of motion. Neck supple.   Cardiovascular: Normal rate, regular rhythm, normal heart sounds and intact distal pulses.     Pulmonary/Chest: Effort normal and breath sounds normal. No respiratory distress.   Abdominal: Soft. Bowel sounds are normal. She exhibits no distension. There is no tenderness. There is no rebound and no guarding.   Musculoskeletal: Normal range of motion. She exhibits no edema, tenderness or deformity.   Neurological: She is alert and oriented to person, place, and time.   Skin: Skin is warm and dry. No rash noted.   Psychiatric: She has a normal mood and affect. Her behavior is normal.   Nursing note and vitals reviewed.      Procedures         ED Course  ED Course                  MDM  Number of Diagnoses or Management Options  Nonintractable headache, unspecified chronicity pattern, unspecified headache type:   Non-intractable vomiting with nausea, unspecified vomiting type:   Diagnosis management comments: 37-year-old female with nausea, vomiting, so we'll.  Well-developed, well-nourished female in no distress with normal vital signs and nonfocal exam.  Her symptoms sound consistent with either a seizure versus syncope versus migraine versus other.  Given she's had workup in the past and is currently following with the appropriate specialist I do not feel an extensive workup is necessary here today.  We'll check labs, treat symptoms.  Disposition is likely home.    DDX: Seizure, syncope, migraine, electrolyte abnormality, viral illness    EKG: sinus rhythm, normal rate, normal axis/intervals, no acute ST changes    Workup here is unremarkable.  She is feeling much better.  We'll discharge home.  Encouraged her to keep her follow-up with her specialist.       Amount and/or Complexity of Data Reviewed  Clinical lab tests: reviewed        Final diagnoses:   Nonintractable headache, unspecified chronicity pattern, unspecified headache type   Non-intractable vomiting with nausea, unspecified vomiting type            Terrence Davidson MD  08/10/17 0366

## 2017-08-28 ENCOUNTER — HOSPITAL ENCOUNTER (OUTPATIENT)
Dept: NUCLEAR MEDICINE | Facility: HOSPITAL | Age: 37
Discharge: HOME | End: 2017-08-28
Attending: INTERNAL MEDICINE

## 2017-08-28 PROCEDURE — 78452 HT MUSCLE IMAGE SPECT MULT: CPT

## 2017-08-28 PROCEDURE — 25010000002 REGADENOSON 0.4 MG/5ML SOLUTION: Performed by: INTERNAL MEDICINE

## 2017-08-28 PROCEDURE — 78452 HT MUSCLE IMAGE SPECT MULT: CPT | Performed by: INTERNAL MEDICINE

## 2017-08-28 PROCEDURE — 93017 CV STRESS TEST TRACING ONLY: CPT

## 2017-08-28 PROCEDURE — 0 TECHNETIUM SESTAMIBI: Performed by: INTERNAL MEDICINE

## 2017-08-28 PROCEDURE — 93018 CV STRESS TEST I&R ONLY: CPT | Performed by: INTERNAL MEDICINE

## 2017-08-28 PROCEDURE — A9500 TC99M SESTAMIBI: HCPCS | Performed by: INTERNAL MEDICINE

## 2017-08-28 RX ADMIN — TECHNETIUM TC-99M SESTAMIBI 1 DOSE: 1 INJECTION INTRAVENOUS at 07:10

## 2017-08-28 RX ADMIN — REGADENOSON 0.4 MG: 0.08 INJECTION, SOLUTION INTRAVENOUS at 07:10

## 2017-08-29 ENCOUNTER — HOSPITAL ENCOUNTER (OUTPATIENT)
Dept: GENERAL RADIOLOGY | Facility: HOSPITAL | Age: 37
Discharge: HOME | End: 2017-08-29
Attending: INTERNAL MEDICINE

## 2017-08-29 ENCOUNTER — HOSPITAL ENCOUNTER (OUTPATIENT)
Dept: NUCLEAR MEDICINE | Facility: HOSPITAL | Age: 37
Discharge: HOME | End: 2017-08-29
Attending: INTERNAL MEDICINE

## 2017-08-29 PROCEDURE — A9500 TC99M SESTAMIBI: HCPCS | Performed by: INTERNAL MEDICINE

## 2017-08-29 PROCEDURE — 0 TECHNETIUM SESTAMIBI: Performed by: INTERNAL MEDICINE

## 2017-08-29 RX ADMIN — TECHNETIUM TC-99M SESTAMIBI 1 DOSE: 1 INJECTION INTRAVENOUS at 06:20

## 2017-08-30 LAB
BH CV STRESS COMMENTS STAGE 1: NORMAL
BH CV STRESS DOSE REGADENOSON STAGE 1: 0.4
BH CV STRESS DURATION MIN STAGE 1: 0
BH CV STRESS DURATION SEC STAGE 1: 15
BH CV STRESS PROTOCOL 1: NORMAL
BH CV STRESS RECOVERY BP: NORMAL MMHG
BH CV STRESS RECOVERY HR: 89 BPM
BH CV STRESS STAGE 1: 1
LV EF NUC BP: 66 %
MAXIMAL PREDICTED HEART RATE: 181 BPM
PERCENT MAX PREDICTED HR: 55.8 %
STRESS BASELINE BP: NORMAL MMHG
STRESS BASELINE HR: 69 BPM
STRESS PERCENT HR: 66 %
STRESS POST PEAK BP: NORMAL MMHG
STRESS POST PEAK HR: 101 BPM
STRESS TARGET HR: 154 BPM

## 2017-09-08 ENCOUNTER — OFFICE VISIT (OUTPATIENT)
Dept: CARDIOLOGY | Facility: CLINIC | Age: 37
End: 2017-09-08

## 2017-09-08 VITALS
HEART RATE: 82 BPM | SYSTOLIC BLOOD PRESSURE: 138 MMHG | DIASTOLIC BLOOD PRESSURE: 60 MMHG | RESPIRATION RATE: 18 BRPM | BODY MASS INDEX: 22.04 KG/M2 | HEIGHT: 68 IN | OXYGEN SATURATION: 97 % | WEIGHT: 145.4 LBS

## 2017-09-08 DIAGNOSIS — R06.02 SHORTNESS OF BREATH: ICD-10-CM

## 2017-09-08 DIAGNOSIS — R07.9 CHEST PAIN IN ADULT: ICD-10-CM

## 2017-09-08 DIAGNOSIS — R55 VASOVAGAL SYNCOPE: ICD-10-CM

## 2017-09-08 DIAGNOSIS — R00.2 PALPITATIONS: Primary | ICD-10-CM

## 2017-09-08 PROCEDURE — 99213 OFFICE O/P EST LOW 20 MIN: CPT | Performed by: INTERNAL MEDICINE

## 2017-09-08 NOTE — PROGRESS NOTES
Subjective:     Encounter Date:09/08/2017      Patient ID: Merissa Wiggins is a 37 y.o. female.    Chief Complaint:Syncope  HPI  This is a 37-year-old female patient who recently had a battery of outpatient testing to evaluate syncope, shortness of breath, chest pain palpitations and edema.  The patient's chest discomfort has spontaneously resolved without specific intervention.  The patient underwent a vasodilator nuclear stress test which showed no evidence of ischemia or infarction with a normal calculated ejection fraction.  The patient no longer has palpitations or syncope.  Her 48 hour Holter monitor was normal.  The patient did not follow through with her tilt table test.  She has had no recurrent syncopal episodes.  She has no orthopnea PND or lower extremity edema.  She continues to have some shortness of breath but overall this appears to have improved somewhat.  Unfortunately the patient continues to smoke.  The patient had an echocardiogram which showed a normal ejection fraction and no regional wall motion abnormalities.  Left ventricular diastolic function was normal as well.  There was no evidence of significant valvular disease, intracardiac shunting, pulmonary hypertension or pericardial disease.  There was no cardiac chamber enlargement or evidence of ventricular hypertrophy.  The following portions of the patient's history were reviewed and updated as appropriate: allergies, current medications, past family history, past medical history, past social history, past surgical history and problem  Review of Systems   Constitution: Negative for chills, diaphoresis, fever, weakness, malaise/fatigue, night sweats, weight gain and weight loss.   HENT: Negative for ear discharge, hearing loss, hoarse voice and nosebleeds.    Eyes: Negative for discharge, double vision, pain and photophobia.   Cardiovascular: Negative for chest pain, claudication, cyanosis, dyspnea on exertion, irregular heartbeat, leg  swelling, near-syncope, orthopnea, palpitations, paroxysmal nocturnal dyspnea and syncope.   Respiratory: Negative for cough, hemoptysis, shortness of breath, sputum production and wheezing.    Endocrine: Negative for cold intolerance, heat intolerance, polydipsia, polyphagia and polyuria.   Hematologic/Lymphatic: Negative for adenopathy and bleeding problem. Does not bruise/bleed easily.   Skin: Negative for color change, flushing, itching and rash.   Musculoskeletal: Negative for muscle cramps, muscle weakness, myalgias and stiffness.   Gastrointestinal: Negative for abdominal pain, diarrhea, hematemesis, hematochezia, nausea and vomiting.   Genitourinary: Negative for dysuria, frequency and nocturia.   Neurological: Negative for focal weakness, loss of balance, numbness, paresthesias and seizures.   Psychiatric/Behavioral: Negative for altered mental status, hallucinations and suicidal ideas.   Allergic/Immunologic: Negative for HIV exposure, hives and persistent infections.       Current Outpatient Prescriptions:   •  amitriptyline (ELAVIL) 50 MG tablet, Take 2 tablets at bedtime, Disp: , Rfl:   •  ondansetron (ZOFRAN) 4 MG tablet, Take 1 tablet by mouth Every 6 (Six) Hours As Needed for Nausea or Vomiting., Disp: 10 tablet, Rfl: 0  •  PARoxetine (PAXIL) 30 MG tablet, Take one daily, Disp: , Rfl:   •  SUMAtriptan (IMITREX) 50 MG tablet, Take one at the onset of migraine, may repeat in 2 hours if needed. Do not exceed 2 in 24 hours, Disp: , Rfl:   No current facility-administered medications for this visit.     Facility-Administered Medications Ordered in Other Visits:   •  regadenoson (LEXISCAN) injection 0.4 mg, 0.4 mg, Intravenous, Once in imaging, Burton Hartman MD     Objective:     Physical Exam   Constitutional: She is oriented to person, place, and time. She appears well-developed and well-nourished.   HENT:   Head: Normocephalic and atraumatic.   Eyes: Conjunctivae are normal. No scleral icterus.  "  Cardiovascular: Normal rate, regular rhythm, normal heart sounds and intact distal pulses.  Exam reveals no gallop and no friction rub.    No murmur heard.  Pulmonary/Chest: Effort normal and breath sounds normal. No respiratory distress.   Abdominal: Soft. Bowel sounds are normal. There is no tenderness.   Musculoskeletal: She exhibits no edema.   Neurological: She is alert and oriented to person, place, and time.   Skin: Skin is warm and dry.   Psychiatric: She has a normal mood and affect. Her behavior is normal.     Blood pressure 138/60, pulse 82, resp. rate 18, height 68\" (172.7 cm), weight 145 lb 6.4 oz (66 kg), SpO2 97 %.   Lab Review:       Assessment:         1. Palpitations  Spontaneous resolution of this symptom.  No evidence of arrhythmia or complex atrial or ventricular ectopy    2. Vasovagal syncope  Spontaneous resolution without evidence of recurrent syncope.  The patient did not have her tilt table test.    3. Shortness of breath  Most likely due to ongoing tobacco abuse with underlying emphysema.  No evidence of a cardiac etiology to her shortness of breath.    4. Chest pain in adult  No evidence of an ischemic etiology.  Her chest discomfort has resolved spontaneously.  Procedures     Plan:       No additional cardiovascular testing is indicated.  No changes in her medication profile as warranted.  The patient has again been counseled regarding the essential need to discontinue cigarette smoking.         "

## 2017-10-24 ENCOUNTER — OFFICE VISIT (OUTPATIENT)
Dept: NEUROLOGY | Facility: CLINIC | Age: 37
End: 2017-10-24

## 2017-10-24 VITALS
SYSTOLIC BLOOD PRESSURE: 136 MMHG | DIASTOLIC BLOOD PRESSURE: 70 MMHG | WEIGHT: 145 LBS | HEIGHT: 68 IN | HEART RATE: 96 BPM | OXYGEN SATURATION: 98 % | BODY MASS INDEX: 21.98 KG/M2

## 2017-10-24 DIAGNOSIS — R55 VASOVAGAL SYNCOPES: ICD-10-CM

## 2017-10-24 DIAGNOSIS — R55 VASOVAGAL SYNCOPE: Primary | ICD-10-CM

## 2017-10-24 PROCEDURE — 99213 OFFICE O/P EST LOW 20 MIN: CPT | Performed by: PSYCHIATRY & NEUROLOGY

## 2017-10-24 NOTE — PROGRESS NOTES
[facility] [place] PROGRESS NOTE  History of Present Illness     Date: 10/24/2017    Patient Identification  Merissa Wiggins is a 37 y.o. female.    Patient information was obtained from patient.  History/Exam limitations: none.    Original consultation requested by: DOUGLAS Moreno      Chief Complaint   Syncope (Pt in office today for follow, pt c/o no change in sx. Pt was seen by cardiology 9/2017)      History of Present Illness   He should is a delightful 37-year-old lady referred to [facility] neurology for evaluation of syncope.  Patient reported that she has multiple episode of syncope without any postictal confusion .  There is no aura prior to any of her event.  Patient underwent EEG and MRI and was reportedly normal however is still not available for review in today's visit.      PMH:   Past Medical History:   Diagnosis Date   • Anxiety    • Depression    • GERD (gastroesophageal reflux disease)    • Migraine        Past Surgical History:   Past Surgical History:   Procedure Laterality Date   • CHOLECYSTECTOMY  2016   • POSTPARTUM TUBAL LIGATION Bilateral 2007   • TUBAL ABDOMINAL LIGATION         Family Hisotry:   Family History   Problem Relation Age of Onset   • Hypertension Mother    • Mental illness Mother    • Hyperlipidemia Mother    • Migraines Mother    • Diabetes Mother    • Mental illness Sister    • Hypertension Maternal Aunt    • Mental illness Maternal Aunt    • Mental illness Paternal Aunt    • Hypertension Maternal Grandmother    • Breast cancer Maternal Grandmother    • Hyperlipidemia Maternal Grandmother    • Diabetes Maternal Grandmother    • Heart attack Maternal Grandmother    • Alzheimer's disease Maternal Grandfather    • Diabetes Maternal Grandfather    • Hypertension Maternal Grandfather    • Hyperlipidemia Maternal Grandfather    • Heart attack Maternal Grandfather    • Kidney disease Maternal Grandfather    • Mental illness Sister    • Mental illness  "Maternal Aunt    • Diabetes Maternal Aunt    • Mental illness Paternal Aunt        Social History:   Social History     Social History   • Marital status:      Spouse name: N/A   • Number of children: N/A   • Years of education: N/A     Occupational History   • Not on file.     Social History Main Topics   • Smoking status: Current Every Day Smoker     Packs/day: 1.00     Years: 30.00     Types: Cigarettes   • Smokeless tobacco: Never Used   • Alcohol use No   • Drug use: No   • Sexual activity: Defer     Other Topics Concern   • Not on file     Social History Narrative       Medications:   Current Outpatient Prescriptions   Medication Sig Dispense Refill   • amitriptyline (ELAVIL) 50 MG tablet Take 2 tablets at bedtime     • PARoxetine (PAXIL) 30 MG tablet Take one daily     • SUMAtriptan (IMITREX) 50 MG tablet Take one at the onset of migraine, may repeat in 2 hours if needed. Do not exceed 2 in 24 hours       No current facility-administered medications for this visit.      Facility-Administered Medications Ordered in Other Visits   Medication Dose Route Frequency Provider Last Rate Last Dose   • regadenoson (LEXISCAN) injection 0.4 mg  0.4 mg Intravenous Once in imaging Burton Hartman MD           Allergy:   Allergies   Allergen Reactions   • Penicillins Other (See Comments)     Pt states \"I passed out\"       Review of Systems:  Review of Systems   Constitutional: Negative for chills and fever.   HENT: Negative for congestion, ear pain, hearing loss, rhinorrhea and sore throat.    Eyes: Negative for pain, discharge and redness.   Respiratory: Negative for cough, shortness of breath, wheezing and stridor.    Cardiovascular: Negative for chest pain, palpitations and leg swelling.   Gastrointestinal: Negative for abdominal pain, constipation, nausea and vomiting.   Endocrine: Negative for cold intolerance, heat intolerance and polyphagia.   Genitourinary: Negative for dysuria, flank pain, frequency and " "urgency.   Musculoskeletal: Negative for joint swelling, myalgias, neck pain and neck stiffness.   Skin: Negative for pallor, rash and wound.   Allergic/Immunologic: Negative for environmental allergies.   Neurological: Positive for syncope. Negative for dizziness, tremors, seizures, facial asymmetry, speech difficulty, weakness, light-headedness, numbness and headaches.   Hematological: Negative for adenopathy.   Psychiatric/Behavioral: Negative for confusion and hallucinations. The patient is not nervous/anxious.        Physical Exam     Vitals:    10/24/17 1046   BP: 136/70   Pulse: 96   SpO2: 98%   Weight: 145 lb (65.8 kg)   Height: 68\" (172.7 cm)     GENERAL: Patient is pleasant, cooperative, appears to be stated age.  Body habitus is endomorphic.  SKIN AND EXTREMITIES:  No skin rashes or lesions are noted.  No cyanosis, clubbing or edema of the extremities.    HEAD:  Head is normocephalic and atraumatic.    NECK: Neck are non-tender without thyromegaly or adenopathy.  Carotic upstrokes are 1+/4.  No cranial or cervical bruits.  The neck is supple with a full range of motion.   ENT: palate elevate symmetrically, no evidence of high arch palate, tongue midline erythema in posterior pharynx, Mallampati Classification Class III   CARDIOVASCULAR:  Regular rate and rhythm with normal S1 and S2 without rub or gallop. This a grade 2/6 systolic murmur in the left lower sternal border.  RESPIRATORY:  Clear to auscultation without wheezes or crackle   ABDOMEN:  Soft and non-tender, positive bowel sound without hepatosplenomegaly  BACK:  Back is straight without midline defect.    PSYCH:  Higher cortical function/mental status:  The patient is alert.  She is oriented x3 to time, place and person.  Recent and the remote memory appear normal.  The patient has a good fund of knowledge.  There is no visual or auditory hallucination or suicidal or homicidal ideation.  SPEECH:There is no gross evidence of aphasia, dysarthria or " agnosia.      CRANIAL NERVES:  Pupils are 4mm, equal round reactive to light, reacting briskly to 2mm without afferent pupillary defect.  Visual fields are intact to confrontation testing.  Fundoscopic examination reveals sharp disk margins with normal vasculature.  No papilledema, hemorrhages or exudates.  Extraocular movements are full and smooth with normal pursuits and saccades.  No nystagmus noted.  The face is symmetric. palate elevate symmetrically, Tongue midline, positive gag reflex. The remainder of the cranial nerves are intact and symmetrical.    MOTOR: Strength is 5/5 throughout with normal tone and bulk with the following exceptions, 4/5 intrinsic muscles of the hands and feet.  No involuntary movements noted.    Deep Tendon Reflexes: are 2/4 and symmetrical in the upper extremities, 2/4 and symmetrical at the knees and 1/4 and symmetrical at the Achilles tendon.  Plantar responses were down-going bilaterally.    SENSATION:  Intact to pinprick, light touch, vibration and proprioception.  Coordination:  The patient normally performs finger-nose-finger, heel-to-knee-to-shin and rapid alternating movements in symmetrical fashion.    COORDINATION AND GAIT:  The patient walks with a narrow-based gait.  Patient is able to heel-toe and tandem walk forward and backwards without difficulty.  Romberg and monopedal  Romberg are negative.    MUSCULOSKELETAL: Range of motion normal, no clubbing, cyanosis, or edema.  No joint swelling.            Studies: I have personally reviewed the following and discussed with the patient.  Results for orders placed or performed during the hospital encounter of 08/10/17   Comprehensive Metabolic Panel   Result Value Ref Range    Glucose 103 (H) 74 - 98 mg/dL    BUN 10 7 - 20 mg/dL    Creatinine 0.70 0.60 - 1.30 mg/dL    Sodium 140 137 - 145 mmol/L    Potassium 3.7 3.5 - 5.1 mmol/L    Chloride 107 98 - 107 mmol/L    CO2 24.0 (L) 26.0 - 30.0 mmol/L    Calcium 8.9 8.4 - 10.2 mg/dL     Total Protein 7.0 6.3 - 8.2 g/dL    Albumin 3.80 3.50 - 5.00 g/dL    ALT (SGPT) 96 (H) 13 - 69 U/L    AST (SGOT) 52 (H) 15 - 46 U/L    Alkaline Phosphatase 163 (H) 38 - 126 U/L    Total Bilirubin 0.4 0.2 - 1.3 mg/dL    eGFR Non African Amer 93 >60 mL/min/1.73    Globulin 3.2 gm/dL    A/G Ratio 1.2 1.0 - 2.0 g/dL    BUN/Creatinine Ratio 14.3 7.1 - 23.5    Anion Gap 12.7 mmol/L   Lipase   Result Value Ref Range    Lipase 101 23 - 300 U/L   Urinalysis With / Culture If Indicated   Result Value Ref Range    Color, UA Yellow Yellow, Straw    Appearance, UA Clear Clear    pH, UA 5.5 5.0 - 8.0    Specific Gravity, UA 1.025 1.005 - 1.030    Glucose, UA Negative Negative    Ketones, UA Trace (A) Negative    Bilirubin, UA Negative Negative    Blood, UA Negative Negative    Protein, UA Negative Negative    Leuk Esterase, UA Negative Negative    Nitrite, UA Negative Negative    Urobilinogen, UA 0.2 E.U./dL 0.2 - 1.0 E.U./dL   hCG, Serum, Qualitative   Result Value Ref Range    HCG Qualitative Negative Negative   CBC Auto Differential   Result Value Ref Range    WBC 11.37 (H) 4.80 - 10.80 10*3/mm3    RBC 4.26 4.20 - 5.40 10*6/mm3    Hemoglobin 12.3 12.0 - 16.0 g/dL    Hematocrit 37.6 37.0 - 47.0 %    MCV 88.3 81.0 - 99.0 fL    MCH 28.9 27.0 - 31.0 pg    MCHC 32.7 30.0 - 37.0 g/dL    RDW 15.6 (H) 11.5 - 14.5 %    RDW-SD 50.3 37.0 - 54.0 fl    MPV 9.6 6.0 - 12.0 fL    Platelets 383 130 - 400 10*3/mm3    Neutrophil % 76.1 37.0 - 80.0 %    Lymphocyte % 13.3 10.0 - 50.0 %    Monocyte % 5.7 0.0 - 12.0 %    Eosinophil % 4.1 0.0 - 7.0 %    Basophil % 0.3 0.0 - 2.5 %    Immature Grans % 0.5 0.0 - 0.6 %    Neutrophils, Absolute 8.65 (H) 2.00 - 6.90 10*3/mm3    Lymphocytes, Absolute 1.51 0.60 - 3.40 10*3/mm3    Monocytes, Absolute 0.65 0.00 - 0.90 10*3/mm3    Eosinophils, Absolute 0.47 0.00 - 0.70 10*3/mm3    Basophils, Absolute 0.03 0.00 - 0.20 10*3/mm3    Immature Grans, Absolute 0.06 0.00 - 0.06 10*3/mm3    nRBC 0.0 0.0 - 0.0 /100  WBC     Records Reviewed: I have personally reviewed her previous medical record.    Merissa was seen today for syncope.    Diagnoses and all orders for this visit:    Vasovagal syncope  -     Tilt Table; Future    Vasovagal syncopes      Treatments:  1.  We'll schedule this patient to have a tilt table test however patient missed her appointment will be scheduled this patient to have a tilt table test again  2.   patient extensively on syncope  Vasovagal syncope occurs in response to a trigger, with a corresponding malfunction in the parts of the nervous system that regulate heart rate and blood pressure. When heart rate slows, blood pressure drops, and the resulting lack of blood to the brain causes fainting and confusion.  Typical triggers for vasovagal episodes include:  Prolonged standing or upright sitting  ·      Orthostatic hypotension   ·      Stress directly related to trauma  ·      Stress  Regardless of the trigger, the mechanism of syncope is similar in the various vasovagal syncope syndromes. In it, the nucleus tractus solitarii of the brainstem is activated directly or indirectly by the triggering stimulus, resulting in simultaneous enhancement of vagal tone and withdrawal of sympathetic tone.  This results in a spectrum of hemodynamic responses:  1.   On one end of the spectrum is the cardioinhibitory response, characterized by a drop in heart rate (negative chronotropic effect) and in contractility (negative inotropic effect) leading to a decrease in cardiac output that is significant enough to result in a loss of consciousness. It is thought that this response results primarily from enhancement in vagal tone.   2.   On the other end of the spectrum is the vasodepressor response, caused by a drop in blood pressure (to as low as 80/20) without much change in heart rate. This phenomenon occurs due to vasodilation, probably as a result of withdrawal of sympathetic tone.  Treatment for vasovagal  syncope focuses on avoidance of triggers, restoring blood flow to the brain during an impending episode,   Certain medications may also be helpful:   o  Beta blockers (â-adrenergic antagonists) were once the most common medication given.  o  Other medications which may be effective include: CNS stimulants: such as Florinef, Midodrin, SSRI.  ·      For people with the cardioinhibitory form of vasovagal syncope, implantation of a pacemaker may be beneficial or even curative    This Document is signed by Guicho Fuller MD, FAAN, FAASM   October 24, 201710:05 PM

## 2017-11-14 ENCOUNTER — TRANSCRIBE ORDERS (OUTPATIENT)
Dept: GENERAL RADIOLOGY | Facility: HOSPITAL | Age: 37
End: 2017-11-14

## 2017-11-14 DIAGNOSIS — R55 SYNCOPE, UNSPECIFIED SYNCOPE TYPE: Primary | ICD-10-CM

## 2017-12-01 ENCOUNTER — APPOINTMENT (OUTPATIENT)
Dept: GENERAL RADIOLOGY | Facility: HOSPITAL | Age: 37
End: 2017-12-01
Attending: PSYCHIATRY & NEUROLOGY

## 2023-03-08 ENCOUNTER — HOSPITAL ENCOUNTER (EMERGENCY)
Facility: HOSPITAL | Age: 43
Discharge: HOME | End: 2023-03-08
Attending: EMERGENCY MEDICINE | Admitting: EMERGENCY MEDICINE
Payer: COMMERCIAL

## 2023-03-08 ENCOUNTER — APPOINTMENT (OUTPATIENT)
Dept: GENERAL RADIOLOGY | Facility: HOSPITAL | Age: 43
End: 2023-03-08
Payer: COMMERCIAL

## 2023-03-08 VITALS
RESPIRATION RATE: 16 BRPM | DIASTOLIC BLOOD PRESSURE: 79 MMHG | BODY MASS INDEX: 19.51 KG/M2 | HEART RATE: 91 BPM | HEIGHT: 68 IN | SYSTOLIC BLOOD PRESSURE: 119 MMHG | OXYGEN SATURATION: 97 % | TEMPERATURE: 99.1 F | WEIGHT: 128.75 LBS

## 2023-03-08 DIAGNOSIS — L03.115 CELLULITIS OF RIGHT LOWER EXTREMITY: ICD-10-CM

## 2023-03-08 DIAGNOSIS — S80.821A: Primary | ICD-10-CM

## 2023-03-08 DIAGNOSIS — L08.9: Primary | ICD-10-CM

## 2023-03-08 LAB
ALBUMIN SERPL-MCNC: 4.1 G/DL (ref 3.5–5.2)
ALBUMIN/GLOB SERPL: 1.3 G/DL
ALP SERPL-CCNC: 82 U/L (ref 39–117)
ALT SERPL W P-5'-P-CCNC: 15 U/L (ref 1–33)
ANION GAP SERPL CALCULATED.3IONS-SCNC: 9.2 MMOL/L (ref 5–15)
AST SERPL-CCNC: 17 U/L (ref 1–32)
BASOPHILS # BLD AUTO: 0.03 10*3/MM3 (ref 0–0.2)
BASOPHILS NFR BLD AUTO: 0.2 % (ref 0–1.5)
BILIRUB SERPL-MCNC: 0.2 MG/DL (ref 0–1.2)
BUN SERPL-MCNC: 18 MG/DL (ref 6–20)
BUN/CREAT SERPL: 21.2 (ref 7–25)
CALCIUM SPEC-SCNC: 9.4 MG/DL (ref 8.6–10.5)
CHLORIDE SERPL-SCNC: 102 MMOL/L (ref 98–107)
CO2 SERPL-SCNC: 25.8 MMOL/L (ref 22–29)
CREAT SERPL-MCNC: 0.85 MG/DL (ref 0.57–1)
CRP SERPL-MCNC: 4.1 MG/DL (ref 0–0.5)
DEPRECATED RDW RBC AUTO: 44.1 FL (ref 37–54)
EGFRCR SERPLBLD CKD-EPI 2021: 86.8 ML/MIN/1.73
EOSINOPHIL # BLD AUTO: 0.37 10*3/MM3 (ref 0–0.4)
EOSINOPHIL NFR BLD AUTO: 3 % (ref 0.3–6.2)
ERYTHROCYTE [DISTWIDTH] IN BLOOD BY AUTOMATED COUNT: 13.4 % (ref 12.3–15.4)
ERYTHROCYTE [SEDIMENTATION RATE] IN BLOOD: 21 MM/HR (ref 0–20)
GLOBULIN UR ELPH-MCNC: 3.1 GM/DL
GLUCOSE SERPL-MCNC: 110 MG/DL (ref 65–99)
HCT VFR BLD AUTO: 38.1 % (ref 34–46.6)
HGB BLD-MCNC: 13.1 G/DL (ref 12–15.9)
IMM GRANULOCYTES # BLD AUTO: 0.03 10*3/MM3 (ref 0–0.05)
IMM GRANULOCYTES NFR BLD AUTO: 0.2 % (ref 0–0.5)
LYMPHOCYTES # BLD AUTO: 2.38 10*3/MM3 (ref 0.7–3.1)
LYMPHOCYTES NFR BLD AUTO: 19.3 % (ref 19.6–45.3)
MCH RBC QN AUTO: 31 PG (ref 26.6–33)
MCHC RBC AUTO-ENTMCNC: 34.4 G/DL (ref 31.5–35.7)
MCV RBC AUTO: 90.1 FL (ref 79–97)
MONOCYTES # BLD AUTO: 1.12 10*3/MM3 (ref 0.1–0.9)
MONOCYTES NFR BLD AUTO: 9.1 % (ref 5–12)
NEUTROPHILS NFR BLD AUTO: 68.2 % (ref 42.7–76)
NEUTROPHILS NFR BLD AUTO: 8.39 10*3/MM3 (ref 1.7–7)
NRBC BLD AUTO-RTO: 0 /100 WBC (ref 0–0.2)
PLATELET # BLD AUTO: 312 10*3/MM3 (ref 140–450)
PMV BLD AUTO: 9.3 FL (ref 6–12)
POTASSIUM SERPL-SCNC: 4.2 MMOL/L (ref 3.5–5.2)
PROT SERPL-MCNC: 7.2 G/DL (ref 6–8.5)
RBC # BLD AUTO: 4.23 10*6/MM3 (ref 3.77–5.28)
SODIUM SERPL-SCNC: 137 MMOL/L (ref 136–145)
WBC NRBC COR # BLD: 12.32 10*3/MM3 (ref 3.4–10.8)

## 2023-03-08 PROCEDURE — 73610 X-RAY EXAM OF ANKLE: CPT

## 2023-03-08 PROCEDURE — 80053 COMPREHEN METABOLIC PANEL: CPT | Performed by: EMERGENCY MEDICINE

## 2023-03-08 PROCEDURE — 36415 COLL VENOUS BLD VENIPUNCTURE: CPT

## 2023-03-08 PROCEDURE — 99282 EMERGENCY DEPT VISIT SF MDM: CPT

## 2023-03-08 PROCEDURE — 85652 RBC SED RATE AUTOMATED: CPT | Performed by: EMERGENCY MEDICINE

## 2023-03-08 PROCEDURE — 85025 COMPLETE CBC W/AUTO DIFF WBC: CPT | Performed by: EMERGENCY MEDICINE

## 2023-03-08 PROCEDURE — 86140 C-REACTIVE PROTEIN: CPT | Performed by: EMERGENCY MEDICINE

## 2023-03-08 RX ORDER — TRAZODONE HYDROCHLORIDE 50 MG/1
TABLET ORAL
COMMUNITY
Start: 2023-02-28

## 2023-03-08 RX ORDER — DOXYCYCLINE 100 MG/1
100 CAPSULE ORAL 2 TIMES DAILY
Qty: 20 CAPSULE | Refills: 0 | Status: SHIPPED | OUTPATIENT
Start: 2023-03-08

## 2023-03-08 RX ORDER — ONDANSETRON 8 MG/1
TABLET, ORALLY DISINTEGRATING ORAL
COMMUNITY
Start: 2023-02-28

## 2023-03-08 RX ORDER — CYCLOBENZAPRINE HCL 5 MG
TABLET ORAL
COMMUNITY
Start: 2023-02-28

## 2023-03-08 RX ORDER — ESCITALOPRAM OXALATE 5 MG/1
TABLET ORAL
COMMUNITY
Start: 2023-03-01

## 2023-03-08 RX ORDER — IBUPROFEN 400 MG/1
TABLET ORAL
COMMUNITY
Start: 2023-02-28

## 2023-03-08 RX ORDER — HYDROXYZINE PAMOATE 25 MG/1
CAPSULE ORAL
COMMUNITY
Start: 2023-02-28

## 2023-03-09 NOTE — ED NOTES
Nurses Note:     Patient states that 11-12 days ago she had to walk a very far distance and had poor supporting shoes on. Patient states that she developed a blister; yesterday she felt pain radiating into ankle but today she noted redness and swelling today.     Right ankle and heel does appear to be red with localized edema to area and there appears to be increased warmth to the right ankle/ heel. No purulent drainage noted at this time.

## 2023-03-09 NOTE — ED PROVIDER NOTES
"Time: 9:30 PM EST  Date of encounter:  3/8/2023  Independent Historian/Clinical History and Information was obtained by:   Patient  Chief Complaint: Wound Infection    History is limited by: N/A    History of Present Illness:  Patient is a 42 y.o. year old female who presents to the emergency department for evaluation of a wound infection x 11-12 days.     Patient presents for evaluation of a wound infection in the area of the right achilles tendon. Patient states she got a blister 11-12 days ago because of her shoes and it has worsened over time. Patient reports inflammation of right posterior ankle today. Patient felt pain radiating into her ankle and notes redness and swelling today. Patient is here from [facility]. No other medical problems. Patient is a smoker. Denies fever, chills, cough, sore throat, vomiting, or diarrhea.         Patient Care Team  Primary Care Provider: Layla Pennington APRN    Past Medical History:     Allergies   Allergen Reactions   • Penicillins Other (See Comments)     Pt states \"I passed out\"     Past Medical History:   Diagnosis Date   • Anxiety    • Depression    • GERD (gastroesophageal reflux disease)    • Migraine      Past Surgical History:   Procedure Laterality Date   • CHOLECYSTECTOMY  2016   • POSTPARTUM TUBAL LIGATION Bilateral 2007   • TUBAL ABDOMINAL LIGATION       Family History   Problem Relation Age of Onset   • Hypertension Mother    • Mental illness Mother    • Hyperlipidemia Mother    • Migraines Mother    • Diabetes Mother    • Mental illness Sister    • Hypertension Maternal Aunt    • Mental illness Maternal Aunt    • Mental illness Paternal Aunt    • Hypertension Maternal Grandmother    • Breast cancer Maternal Grandmother    • Hyperlipidemia Maternal Grandmother    • Diabetes Maternal Grandmother    • Heart attack Maternal Grandmother    • Alzheimer's disease Maternal Grandfather    • Diabetes Maternal Grandfather    • Hypertension Maternal " Grandfather    • Hyperlipidemia Maternal Grandfather    • Heart attack Maternal Grandfather    • Kidney disease Maternal Grandfather    • Mental illness Sister    • Mental illness Maternal Aunt    • Diabetes Maternal Aunt    • Mental illness Paternal Aunt        Home Medications:  Prior to Admission medications    Medication Sig Start Date End Date Taking? Authorizing Provider   cyclobenzaprine (FLEXERIL) 5 MG tablet  2/28/23   ProviderRicardo MD   escitalopram (LEXAPRO) 5 MG tablet  3/1/23   ProviderRicardo MD   hydrOXYzine pamoate (VISTARIL) 25 MG capsule  2/28/23   Provider, MD Ricardo   ibuprofen (ADVIL,MOTRIN) 400 MG tablet  2/28/23   ProviderRicardo MD   ondansetron ODT (ZOFRAN-ODT) 8 MG disintegrating tablet  2/28/23   ProviderRicardo MD   traZODone (DESYREL) 50 MG tablet  2/28/23   Provider, MD Ricardo   amitriptyline (ELAVIL) 50 MG tablet Take 2 tablets at bedtime 6/8/17 3/8/23  Layla Pennington APRN   PARoxetine (PAXIL) 30 MG tablet Take one daily 7/10/17 3/8/23  Ricardo Woods MD   SUMAtriptan (IMITREX) 50 MG tablet Take one at the onset of migraine, may repeat in 2 hours if needed. Do not exceed 2 in 24 hours 7/10/17 3/8/23  Layla Pennington APRN        Social History:   Social History     Tobacco Use   • Smoking status: Every Day     Packs/day: 1.00     Years: 30.00     Pack years: 30.00     Types: Cigarettes   • Smokeless tobacco: Never   Substance Use Topics   • Alcohol use: No   • Drug use: No         Review of Systems:  Review of Systems   Constitutional: Negative for chills and fever.   HENT: Negative for congestion, ear pain and sore throat.    Eyes: Negative for pain.   Respiratory: Negative for cough, chest tightness and shortness of breath.    Cardiovascular: Negative for chest pain.   Gastrointestinal: Negative for abdominal pain, diarrhea, nausea and vomiting.   Genitourinary: Negative for flank pain and hematuria.  "  Musculoskeletal: Negative for joint swelling.   Skin: Positive for wound (blister). Negative for pallor.   Neurological: Negative for seizures and headaches.   All other systems reviewed and are negative.       Physical Exam:  /79   Pulse 91   Temp 99.1 °F (37.3 °C) (Oral)   Resp 16   Ht 172.7 cm (68\")   Wt 58.4 kg (128 lb 12 oz)   LMP 02/26/2023 (Approximate)   SpO2 97%   BMI 19.58 kg/m²     Physical Exam  Vitals and nursing note reviewed.   Constitutional:       General: She is not in acute distress.     Appearance: Normal appearance. She is not toxic-appearing.   HENT:      Head: Normocephalic and atraumatic.      Jaw: There is normal jaw occlusion.   Eyes:      General: Lids are normal.      Extraocular Movements: Extraocular movements intact.      Conjunctiva/sclera: Conjunctivae normal.      Pupils: Pupils are equal, round, and reactive to light.   Cardiovascular:      Rate and Rhythm: Normal rate and regular rhythm.      Pulses: Normal pulses.           Dorsalis pedis pulses are 2+ on the left side.      Heart sounds: Normal heart sounds.   Pulmonary:      Effort: Pulmonary effort is normal. No respiratory distress.      Breath sounds: Normal breath sounds. No wheezing or rhonchi.   Abdominal:      General: Abdomen is flat.      Palpations: Abdomen is soft.      Tenderness: There is no abdominal tenderness. There is no guarding or rebound.   Musculoskeletal:         General: Normal range of motion.      Cervical back: Normal range of motion and neck supple.      Right lower leg: No edema.      Left lower leg: No edema.   Feet:      Right foot:      Skin integrity: Blister and erythema present.      Comments: Unroofed blister on right posterior ankle in the area of the achilles tendon with surrounding erythema. No lymphedema streaks.  Skin:     General: Skin is warm and dry.      Findings: Erythema, lesion and rash present.      Comments: There is erythema and a deflated bulla at region of " achilles.  Superficial without signs of achilles invovement   Neurological:      General: No focal deficit present.      Mental Status: She is alert and oriented to person, place, and time. Mental status is at baseline.      Cranial Nerves: Cranial nerves 2-12 are intact.      Sensory: Sensation is intact.      Motor: Motor function is intact.      Comments: Distal neurologically intact.    Psychiatric:         Mood and Affect: Mood normal.                  Procedures:  Procedures      Medical Decision Making:      Comorbidities that affect care:    Smoking    External Notes reviewed:    None      The following orders were placed and all results were independently analyzed by me:  Orders Placed This Encounter   Procedures   • XR Ankle 3+ View Right   • Comprehensive Metabolic Panel   • Sedimentation Rate   • C-reactive Protein   • CBC Auto Differential   • CBC & Differential       Medications Given in the Emergency Department:  Medications - No data to display     ED Course:         Labs:    Lab Results (last 24 hours)     Procedure Component Value Units Date/Time    CBC & Differential [[id number]]  (Abnormal) Collected: 03/08/23 2219    Specimen: Blood Updated: 03/08/23 2228    Narrative:      The following orders were created for panel order CBC & Differential.  Procedure                               Abnormality         Status                     ---------                               -----------         ------                     CBC Auto Differential[[id number]]        Abnormal            Final result                 Please view results for these tests on the individual orders.    Comprehensive Metabolic Panel [[id number]]  (Abnormal) Collected: 03/08/23 2219    Specimen: Blood Updated: 03/08/23 2301     Glucose 110 mg/dL      BUN 18 mg/dL      Creatinine 0.85 mg/dL      Sodium 137 mmol/L      Potassium 4.2 mmol/L      Chloride 102 mmol/L      CO2 25.8 mmol/L      Calcium 9.4 mg/dL      Total Protein 7.2 g/dL       Albumin 4.1 g/dL      ALT (SGPT) 15 U/L      AST (SGOT) 17 U/L      Alkaline Phosphatase 82 U/L      Total Bilirubin 0.2 mg/dL      Globulin 3.1 gm/dL      A/G Ratio 1.3 g/dL      BUN/Creatinine Ratio 21.2     Anion Gap 9.2 mmol/L      eGFR 86.8 mL/min/1.73     Narrative:      GFR Normal >60  Chronic Kidney Disease <60  Kidney Failure <15      Sedimentation Rate [[id number]]  (Abnormal) Collected: 03/08/23 2219    Specimen: Blood Updated: 03/08/23 2231     Sed Rate 21 mm/hr     C-reactive Protein [[id number]]  (Abnormal) Collected: 03/08/23 2219    Specimen: Blood Updated: 03/08/23 2301     C-Reactive Protein 4.10 mg/dL     CBC Auto Differential [[id number]]  (Abnormal) Collected: 03/08/23 2219    Specimen: Blood Updated: 03/08/23 2228     WBC 12.32 10*3/mm3      RBC 4.23 10*6/mm3      Hemoglobin 13.1 g/dL      Hematocrit 38.1 %      MCV 90.1 fL      MCH 31.0 pg      MCHC 34.4 g/dL      RDW 13.4 %      RDW-SD 44.1 fl      MPV 9.3 fL      Platelets 312 10*3/mm3      Neutrophil % 68.2 %      Lymphocyte % 19.3 %      Monocyte % 9.1 %      Eosinophil % 3.0 %      Basophil % 0.2 %      Immature Grans % 0.2 %      Neutrophils, Absolute 8.39 10*3/mm3      Lymphocytes, Absolute 2.38 10*3/mm3      Monocytes, Absolute 1.12 10*3/mm3      Eosinophils, Absolute 0.37 10*3/mm3      Basophils, Absolute 0.03 10*3/mm3      Immature Grans, Absolute 0.03 10*3/mm3      nRBC 0.0 /100 WBC            Imaging:    XR Ankle 3+ View Right    Result Date: 3/8/2023  PROCEDURE: XR ANKLE 3+ VW RIGHT  COMPARISON: None  INDICATIONS: RIGHT ANKLE PAIN, REDNESS, AND SWELLING; BLISTER ON POSTERIOR RIGHT ANKLE FOR 12 DAYS THAT HAS WORSENED AND REDNESS HAS SPREAD TO MEDIAL AND LATERAL MALLEOLUS  FINDINGS:  There is mild lateral malleolar soft tissue swelling.  No acute fracture or dislocation.  No erosions.  The ankle mortise is symmetric and the talar dome appears intact.       Lateral malleolar soft tissue swelling without acute osseous abnormality.       ODILON HAWKINS MD       Electronically Signed and Approved By: ODILON HAWKINS MD on 3/08/2023 at 22:43                 Differential Diagnosis and Discussion:    Extremity Pain: Differential diagnosis includes but is not limited to soft tissue sprain, tendonitis, tendon injury, dislocation, fracture, deep vein thrombosis, arterial insufficiency, osteoarthritis, bursitis, and ligamentous damage.    All labs were reviewed and interpreted by me.  All X-rays were independently reviewed by me.    MDM  Number of Diagnoses or Management Options     Amount and/or Complexity of Data Reviewed  Clinical lab tests: reviewed  Tests in the radiology section of CPT®: reviewed    Patient Progress  Patient progress: stable (23:04 EST Updated patient on results and plan for discharge. Patient expressed understanding and agreement. All questions answered at this time.)           Patient Care Considerations:    MRI: I considered ordering an MRI however There are no signs of deep infection or osteomyelitis      Consultants/Shared Management Plan:    None    Social Determinants of Health:    Patient is independent, reliable, and has access to care.       Disposition and Care Coordination:    Discharged: The patient is suitable and stable for discharge with no need for consideration of observation or admission.    I have explained discharge medications and the need for follow up with the patient/caretakers. This was also printed in the discharge instructions. Patient was discharged with the following medications and follow up:      Medication List      New Prescriptions    doxycycline 100 MG capsule  Commonly known as: MONODOX  Take 1 capsule by mouth 2 (Two) Times a Day.     mupirocin 2 % ointment  Commonly known as: BACTROBAN  Apply 1 application topically to the appropriate area as directed 3 (Three) Times a Day.           Where to Get Your Medications      These medications were sent to [facility]/pharmacy #[id number] - [place], [place] - [address] - [phone] [facility] [phone] FX  [address] [place] [address]    Hours: 24-hours Phone: [phone]   · doxycycline 100 MG capsule  · mupirocin 2 % ointment      Layla Pennington, APRN  [address] [facility] [place] [address] [phone]    In 2 days  If no better.       Final diagnoses:   Infected blister of lower leg, right, initial encounter   Cellulitis of right lower extremity        ED Disposition     ED Disposition   Discharge    Condition   Stable    Comment   --             This medical record created using voice recognition software.    Documentation assistance provided by Mare Golden acting as scribe for No att. providers found. Information recorded by the scribe was done at my direction and has been verified and validated by me.          Mare Golden  03/08/23 2212       Mare Golden  03/08/23 2306       Ronaldo Phillips, DO  03/09/23 1222

## 2023-03-09 NOTE — DISCHARGE INSTRUCTIONS
Wash area 3 times daily with soapy water and dry well.  Apply Bactroban ointment as directed 3 times daily.  Keep the area clean and dry and avoid pressure from shoes and other footwear.  Keep your foot elevated when possible.  Take antibiotic as directed.  Return for worsening symptoms.  Follow-up with your doctor in 2 days if no better

## 2023-04-10 ENCOUNTER — LAB (OUTPATIENT)
Dept: LAB | Facility: HOSPITAL | Age: 43
End: 2023-04-10
Payer: COMMERCIAL

## 2023-04-10 ENCOUNTER — OFFICE VISIT (OUTPATIENT)
Dept: FAMILY MEDICINE CLINIC | Facility: CLINIC | Age: 43
End: 2023-04-10
Payer: COMMERCIAL

## 2023-04-10 VITALS
BODY MASS INDEX: 19.79 KG/M2 | OXYGEN SATURATION: 98 % | TEMPERATURE: 97.8 F | SYSTOLIC BLOOD PRESSURE: 130 MMHG | DIASTOLIC BLOOD PRESSURE: 92 MMHG | HEART RATE: 113 BPM | WEIGHT: 130.6 LBS | HEIGHT: 68 IN

## 2023-04-10 DIAGNOSIS — B37.31 VAGINAL YEAST INFECTION: Primary | ICD-10-CM

## 2023-04-10 DIAGNOSIS — G47.00 INSOMNIA, UNSPECIFIED TYPE: ICD-10-CM

## 2023-04-10 DIAGNOSIS — Z86.19 HISTORY OF HEPATITIS B: ICD-10-CM

## 2023-04-10 DIAGNOSIS — F33.0 MAJOR DEPRESSIVE DISORDER, RECURRENT, MILD: ICD-10-CM

## 2023-04-10 DIAGNOSIS — M41.9 SCOLIOSIS, UNSPECIFIED SCOLIOSIS TYPE, UNSPECIFIED SPINAL REGION: ICD-10-CM

## 2023-04-10 DIAGNOSIS — F41.1 GAD (GENERALIZED ANXIETY DISORDER): ICD-10-CM

## 2023-04-10 PROCEDURE — 80053 COMPREHEN METABOLIC PANEL: CPT

## 2023-04-10 RX ORDER — FLUCONAZOLE 150 MG/1
150 TABLET ORAL ONCE
Qty: 1 TABLET | Refills: 0 | Status: SHIPPED | OUTPATIENT
Start: 2023-04-10 | End: 2023-04-10

## 2023-04-10 RX ORDER — ESCITALOPRAM OXALATE 10 MG/1
10 TABLET ORAL DAILY
Qty: 30 TABLET | Refills: 1 | Status: SHIPPED | OUTPATIENT
Start: 2023-04-10

## 2023-04-10 RX ORDER — TRAZODONE HYDROCHLORIDE 50 MG/1
50 TABLET ORAL NIGHTLY
Qty: 30 TABLET | Refills: 0 | Status: SHIPPED | OUTPATIENT
Start: 2023-04-10

## 2023-04-10 RX ORDER — HYDROXYZINE PAMOATE 50 MG/1
50 CAPSULE ORAL 3 TIMES DAILY PRN
Qty: 90 CAPSULE | Refills: 0 | Status: SHIPPED | OUTPATIENT
Start: 2023-04-10

## 2023-04-10 NOTE — PROGRESS NOTES
"Chief Complaint  new patient preventive medicine service, Vaginal Discharge (Vaginal Irritation/X 2 weeks/Thick white mucus looking), Groin Pain (Period cramp pain/X 2 weeks), Hypertension (Talk about high blood pressure), and referral for therapy    Subjective        Merissa Wiggins presents to [facility]  History of Present Illness  Pt is here today to establish care. She has been experiencing vaginal discharge/irritation x2 weeks. She was tested and all tests came back negative. She has also had lower bilateral pelvic pain. Pain feels like menstrual cramps. She is still experiencing discharge. Will send Diflucan today for likely yeast infection due to recent antibiotic therapy.     She states her BP has been Borderline high recently. HTN runs in the family. BP is slightly elevated today. She will return in 2 weeks for follow up.    She has history of scoliosis. She would like to see someone to discuss injections; states this option was given to her before. She is experiencing increased pain.Will refer to Pain Management.     Pt has history of depression, anxiety, and insomnia. She states that her current medication regimen seems to be working well for her. Will continue medications and refer to Behavioral Health.     Pt reports history of Hepatitis B. She would like to have lab work done today to check liver function.     Objective   Vital Signs:  /92   Pulse 113   Temp 97.8 °F (36.6 °C) (Temporal)   Ht 172.7 cm (68\")   Wt 59.2 kg (130 lb 9.6 oz)   SpO2 98%   BMI 19.86 kg/m²   Estimated body mass index is 19.86 kg/m² as calculated from the following:    Height as of this encounter: 172.7 cm (68\").    Weight as of this encounter: 59.2 kg (130 lb 9.6 oz).       BMI is within normal parameters. No other follow-up for BMI required.    Physical Exam   Result Review :                 Assessment and Plan   Diagnoses and all orders for this visit:    1. Vaginal yeast infection " (Primary)  -     fluconazole (Diflucan) 150 MG tablet; Take 1 tablet by mouth 1 (One) Time for 1 dose.  Dispense: 1 tablet; Refill: 0    2. GENEVA (generalized anxiety disorder)  -     hydrOXYzine pamoate (VISTARIL) 50 MG capsule; Take 1 capsule by mouth 3 (Three) Times a Day As Needed for Itching or Anxiety.  Dispense: 90 capsule; Refill: 0  -     Ambulatory Referral to Behavioral Health    3. Major depressive disorder, recurrent, mild  -     escitalopram (LEXAPRO) 10 MG tablet; Take 1 tablet by mouth Daily.  Dispense: 30 tablet; Refill: 1  -     Ambulatory Referral to Behavioral Health    4. Insomnia, unspecified type  -     traZODone (DESYREL) 50 MG tablet; Take 1 tablet by mouth Every Night.  Dispense: 30 tablet; Refill: 0  -     Ambulatory Referral to Behavioral Health    5. History of hepatitis B  -     Comprehensive Metabolic Panel; Future    6. Scoliosis, unspecified scoliosis type, unspecified spinal region  -     Ambulatory Referral to Pain Management  -     Ambulatory Referral to Neurosurgery           Follow Up   Return in about 2 weeks (around 4/24/2023) for Recheck blood pressure.  Patient was given instructions and counseling regarding her condition or for health maintenance advice. Please see specific information pulled into the AVS if appropriate.

## 2023-04-11 LAB
ALBUMIN SERPL-MCNC: 4.3 G/DL (ref 3.5–5.2)
ALBUMIN/GLOB SERPL: 1.4 G/DL
ALP SERPL-CCNC: 64 U/L (ref 39–117)
ALT SERPL W P-5'-P-CCNC: 24 U/L (ref 1–33)
ANION GAP SERPL CALCULATED.3IONS-SCNC: 11.5 MMOL/L (ref 5–15)
AST SERPL-CCNC: 22 U/L (ref 1–32)
BILIRUB SERPL-MCNC: 0.3 MG/DL (ref 0–1.2)
BUN SERPL-MCNC: 12 MG/DL (ref 6–20)
BUN/CREAT SERPL: 16.9 (ref 7–25)
CALCIUM SPEC-SCNC: 10 MG/DL (ref 8.6–10.5)
CHLORIDE SERPL-SCNC: 103 MMOL/L (ref 98–107)
CO2 SERPL-SCNC: 22.5 MMOL/L (ref 22–29)
CREAT SERPL-MCNC: 0.71 MG/DL (ref 0.57–1)
EGFRCR SERPLBLD CKD-EPI 2021: 107.7 ML/MIN/1.73
GLOBULIN UR ELPH-MCNC: 3.1 GM/DL
GLUCOSE SERPL-MCNC: 90 MG/DL (ref 65–99)
POTASSIUM SERPL-SCNC: 4.7 MMOL/L (ref 3.5–5.2)
PROT SERPL-MCNC: 7.4 G/DL (ref 6–8.5)
SODIUM SERPL-SCNC: 137 MMOL/L (ref 136–145)

## 2023-04-20 ENCOUNTER — OFFICE VISIT (OUTPATIENT)
Dept: PAIN MEDICINE | Facility: CLINIC | Age: 43
End: 2023-04-20
Payer: COMMERCIAL

## 2023-04-20 VITALS
DIASTOLIC BLOOD PRESSURE: 88 MMHG | RESPIRATION RATE: 12 BRPM | HEIGHT: 68 IN | WEIGHT: 134.2 LBS | BODY MASS INDEX: 20.34 KG/M2 | TEMPERATURE: 96 F | SYSTOLIC BLOOD PRESSURE: 132 MMHG | OXYGEN SATURATION: 96 % | HEART RATE: 111 BPM

## 2023-04-20 DIAGNOSIS — M41.9 SCOLIOSIS, UNSPECIFIED SCOLIOSIS TYPE, UNSPECIFIED SPINAL REGION: Primary | ICD-10-CM

## 2023-04-20 DIAGNOSIS — M41.9 SCOLIOSIS OF THORACOLUMBAR SPINE, UNSPECIFIED SCOLIOSIS TYPE: Primary | ICD-10-CM

## 2023-04-20 RX ORDER — TIZANIDINE 4 MG/1
4 TABLET ORAL NIGHTLY
Qty: 90 TABLET | Refills: 0 | Status: SHIPPED | OUTPATIENT
Start: 2023-04-20

## 2023-04-20 NOTE — PROGRESS NOTES
04/20/2023      Referring Physician: Rosario Farrell APRN  [address] [place] [address]    Primary Physician: Rosario Farrell APRN    CHIEF COMPLAINT or REASON FOR VISIT: Back Pain (New patient)      HISTORY OF PRESENT ILLNESS:  Ms. Merissa Wiggins is 43 y.o. female who presents as a new patient referral for evaluation and treatment of chronic neck and low back pain.  Ms. Cote has had back pain her entire life due to scoliosis.  She is in addiction recovery.  She apparently was told at some point in the past that she would be a candidate for spine injections however she did not proceed.  She has never had any spine surgeries.  She describes right-sided tightness and pain in her neck along her trapezius as well as axial low thoracic/upper lumbar pain at the apex of her curvature.  She additionally describes burning pain radiating from her buttocks down the lateral aspect of bilateral legs terminating above the knee.  Patient denies any bowel or bladder dysfunction, lower extremity weakness, new onset saddle anesthesia or unexplained weight loss.  She did have good results with physical therapy years ago.          Objective Pain Scoring:   BRIEF PAIN INVENTORY:  Total score:   Pain Score    04/20/23 1039   PainSc:   5   PainLoc: Back  Comment: radiates into lags, neck      PHQ-2: PHQ-2 Total Score: 0  PHQ-9: PHQ-9: Brief Depression Severity Measure Score: 0  Opioid Risk Tool:         Review of Systems:   ROS negative except as otherwise noted     Past Medical History:   Past Medical History:   Diagnosis Date   • Anxiety    • Depression    • GERD (gastroesophageal reflux disease)    • Migraine          Past Surgical History:   Past Surgical History:   Procedure Laterality Date   • CHOLECYSTECTOMY  2016   • POSTPARTUM TUBAL LIGATION Bilateral 2007   • TUBAL ABDOMINAL LIGATION           Family History   Family History   Problem Relation Age of Onset   • Hypertension Mother    • Mental illness Mother     • Hyperlipidemia Mother    • Migraines Mother    • Diabetes Mother    • Mental illness Sister    • Hypertension Maternal Aunt    • Mental illness Maternal Aunt    • Mental illness Paternal Aunt    • Hypertension Maternal Grandmother    • Breast cancer Maternal Grandmother    • Hyperlipidemia Maternal Grandmother    • Diabetes Maternal Grandmother    • Heart attack Maternal Grandmother    • Alzheimer's disease Maternal Grandfather    • Diabetes Maternal Grandfather    • Hypertension Maternal Grandfather    • Hyperlipidemia Maternal Grandfather    • Heart attack Maternal Grandfather    • Kidney disease Maternal Grandfather    • Mental illness Sister    • Mental illness Maternal Aunt    • Diabetes Maternal Aunt    • Mental illness Paternal Aunt          Social History   Social History     Socioeconomic History   • Marital status:    Tobacco Use   • Smoking status: Every Day     Packs/day: 1.00     Years: 30.00     Pack years: 30.00     Types: Cigarettes   • Smokeless tobacco: Never   Vaping Use   • Vaping Use: Never used   Substance and Sexual Activity   • Alcohol use: No   • Drug use: Not Currently     Types: Marijuana, Methamphetamines     Comment: in recovery   • Sexual activity: Yes        Medications:     Current Outpatient Medications:   •  escitalopram (LEXAPRO) 10 MG tablet, Take 1 tablet by mouth Daily., Disp: 30 tablet, Rfl: 1  •  hydrOXYzine pamoate (VISTARIL) 50 MG capsule, Take 1 capsule by mouth 3 (Three) Times a Day As Needed for Itching or Anxiety., Disp: 90 capsule, Rfl: 0  •  ibuprofen (ADVIL,MOTRIN) 400 MG tablet, , Disp: , Rfl:   •  traZODone (DESYREL) 50 MG tablet, Take 1 tablet by mouth Every Night., Disp: 30 tablet, Rfl: 0  No current facility-administered medications for this visit.    Facility-Administered Medications Ordered in Other Visits:   •  regadenoson (LEXISCAN) injection 0.4 mg, 0.4 mg, Intravenous, Once in imaging, Breeding, Burton GONZALEZ MD        Physical Exam:     Vitals:     "04/20/23 1039   BP: 132/88   BP Location: Left arm   Patient Position: Sitting   Cuff Size: Adult   Pulse: 111   Resp: 12   Temp: 96 °F (35.6 °C)   TempSrc: Infrared   SpO2: 96%   Weight: 60.9 kg (134 lb 3.2 oz)   Height: 172.7 cm (68\")   PainSc:   5   PainLoc: Back  Comment: radiates into lags, neck        General: Alert and oriented, No acute distress.   HEENT: Normocephalic, atraumatic.   Cardiovascular: No gross edema  Respiratory: Respirations are non-labored    Cervical Spine:   No masses or atrophy  Range of motion - Flexion normal. Extension normal. Lateral rotation normal.   Palpation -tender palpation right trapezius  Spurling's - negative     Thoracic Spine:   Inspection: Levoscoliosis  Paraspinal muscle palpation: Tender  Spinous process palpation: nontender    Lumbar Spine:   No masses or atrophy  Range of motion - Flexion normal. Extension normal.    Facet Loading: Positive bilaterally  Facet Palpation - tender bilaterally     Motor Exam:    Bilateral upper extremity grossly normal  Bilateral lower extremity grossly normal    Sensory Exam: Pain with light palpation bilateral lateral thigh terminating above knee.    Neurologic: Cranial Nerves II-XII are grossly intact.     Psychiatric: Cooperative.   Gait: Normal   Assistive Devices: None    Imaging Studies:   No results found for this or any previous visit.      Impression & Plan:   Ms. Merissa Wiggins is a 43 y.o. female with past medical history significant for addiction recovery, scoliosis who presents to the pain clinic for evaluation and treatment of chronic neck and low back pain.  Clinical examination consistent with cervical muscle pain, thoracolumbar facet pain secondary to scoliosis.  We will start with conservative measures including physical therapy as well as obtain radiographs of the full spine.  Additionally we will trial tizanidine at night as she seems to have the most pain while sleeping.    1. Scoliosis of thoracolumbar spine, " unspecified scoliosis type        PLAN:  1. Medication Recommendations: Recommend Voltaren topical, NSAIDs, Tylenol.  Can trial turmeric 500 mg twice daily if NSAID contraindicated.  Start tizanidine 4 mg nightly.    2. Physical Therapy: Referral given today for neck and low back pain.    3. Psychological: defer    4. Complementary and alternative (CAM) Therapies: Consider chiropractic for low back pain.    5. Labs: None indicated     6. Imaging: We will obtain cervical thoracic and lumbar radiographs.    7. Interventions: None indicated.  We did discuss rhizotomy    8. Referrals: Physical therapy    9. Records requested: n/a    10. Lifestyle goals:    Follow-up 3 to 4 months      [facility]  Santos Kelsey MD

## 2023-09-11 ENCOUNTER — TELEPHONE (OUTPATIENT)
Age: 43
End: 2023-09-11

## 2023-09-11 NOTE — TELEPHONE ENCOUNTER
Caller:     Relationship to patient: Other/ MRS MENEZES    Best call back number: [phone]    Chief complaint: NA    Type of visit: YEARLY WITH PAP    Requested date: ASAP     If rescheduling, when is the original appointment:      Additional notes: